# Patient Record
Sex: FEMALE | Race: WHITE | Employment: FULL TIME | URBAN - METROPOLITAN AREA
[De-identification: names, ages, dates, MRNs, and addresses within clinical notes are randomized per-mention and may not be internally consistent; named-entity substitution may affect disease eponyms.]

---

## 2017-01-06 ENCOUNTER — TELEPHONE (OUTPATIENT)
Dept: OBGYN CLINIC | Facility: CLINIC | Age: 32
End: 2017-01-06

## 2017-01-06 ENCOUNTER — NURSE ONLY (OUTPATIENT)
Dept: OBGYN CLINIC | Facility: CLINIC | Age: 32
End: 2017-01-06

## 2017-01-06 DIAGNOSIS — Z32.01 POSITIVE URINE PREGNANCY TEST: Primary | ICD-10-CM

## 2017-01-06 PROCEDURE — 84144 ASSAY OF PROGESTERONE: CPT | Performed by: OBSTETRICS & GYNECOLOGY

## 2017-01-06 PROCEDURE — 84702 CHORIONIC GONADOTROPIN TEST: CPT | Performed by: OBSTETRICS & GYNECOLOGY

## 2017-01-06 PROCEDURE — 36415 COLL VENOUS BLD VENIPUNCTURE: CPT | Performed by: OBSTETRICS & GYNECOLOGY

## 2017-01-06 NOTE — TELEPHONE ENCOUNTER
had two positive pregnancy test pt stated had heavy bleeding a week ago has appt for Monday with dr Jordan Corrales but wants to come in today for blood draw

## 2017-01-07 LAB
B-HCG SERPL-ACNC: 4135 MIU/ML
PROGEST SERPL-MCNC: 8 NG/ML

## 2017-01-09 ENCOUNTER — OFFICE VISIT (OUTPATIENT)
Dept: OBGYN CLINIC | Facility: CLINIC | Age: 32
End: 2017-01-09

## 2017-01-09 VITALS
SYSTOLIC BLOOD PRESSURE: 104 MMHG | HEIGHT: 67 IN | BODY MASS INDEX: 18.99 KG/M2 | WEIGHT: 121 LBS | DIASTOLIC BLOOD PRESSURE: 64 MMHG

## 2017-01-09 DIAGNOSIS — Z32.01 POSITIVE BLOOD PREGNANCY TEST: Primary | ICD-10-CM

## 2017-01-09 PROBLEM — O26.851 SPOTTING IN FIRST TRIMESTER: Status: ACTIVE | Noted: 2017-01-09

## 2017-01-09 LAB
B-HCG SERPL-ACNC: 6423 MIU/ML
PROGEST SERPL-MCNC: 5.8 NG/ML

## 2017-01-09 PROCEDURE — 99213 OFFICE O/P EST LOW 20 MIN: CPT | Performed by: OBSTETRICS & GYNECOLOGY

## 2017-01-09 PROCEDURE — 84144 ASSAY OF PROGESTERONE: CPT | Performed by: OBSTETRICS & GYNECOLOGY

## 2017-01-09 PROCEDURE — 84702 CHORIONIC GONADOTROPIN TEST: CPT | Performed by: OBSTETRICS & GYNECOLOGY

## 2017-01-09 PROCEDURE — 36415 COLL VENOUS BLD VENIPUNCTURE: CPT | Performed by: OBSTETRICS & GYNECOLOGY

## 2017-01-09 NOTE — PROGRESS NOTES
Bladimir Hines is here for a checkup. She is 27-year-old  3 para 1011 patient says that she is nursing every 4-6 hours her baby is 8 months old.   Her first normal.  After delivery was  following that she had a period  which l

## 2017-01-10 ENCOUNTER — TELEPHONE (OUTPATIENT)
Dept: OBGYN CLINIC | Facility: CLINIC | Age: 32
End: 2017-01-10

## 2017-01-10 DIAGNOSIS — O20.9 FIRST TRIMESTER BLEEDING: Primary | ICD-10-CM

## 2017-01-10 NOTE — TELEPHONE ENCOUNTER
Yarely Giles called regarding her beta hCG results from 6 to January and 9 January. I discussed both results with her. Her serum progesterone on has gone down to 5.8 and a beta-hCG has not risen as it should. Patient is taking progesterone orally.     Plan on

## 2017-01-11 ENCOUNTER — NURSE ONLY (OUTPATIENT)
Dept: OBGYN CLINIC | Facility: CLINIC | Age: 32
End: 2017-01-11

## 2017-01-11 ENCOUNTER — HOSPITAL ENCOUNTER (OUTPATIENT)
Dept: ULTRASOUND IMAGING | Facility: HOSPITAL | Age: 32
Discharge: HOME | End: 2017-01-11
Attending: OBSTETRICS & GYNECOLOGY
Payer: COMMERCIAL

## 2017-01-11 DIAGNOSIS — O20.9 FIRST TRIMESTER BLEEDING: ICD-10-CM

## 2017-01-11 DIAGNOSIS — O20.0 THREATENED ABORTION, ANTEPARTUM: Primary | ICD-10-CM

## 2017-01-11 LAB
B-HCG SERPL-ACNC: 7996 MIU/ML
PROGEST SERPL-MCNC: 17.9 NG/ML

## 2017-01-11 PROCEDURE — 84702 CHORIONIC GONADOTROPIN TEST: CPT | Performed by: OBSTETRICS & GYNECOLOGY

## 2017-01-11 PROCEDURE — 36415 COLL VENOUS BLD VENIPUNCTURE: CPT | Performed by: OBSTETRICS & GYNECOLOGY

## 2017-01-11 PROCEDURE — 76817 TRANSVAGINAL US OBSTETRIC: CPT

## 2017-01-11 PROCEDURE — 76801 OB US < 14 WKS SINGLE FETUS: CPT

## 2017-01-11 PROCEDURE — 84144 ASSAY OF PROGESTERONE: CPT | Performed by: OBSTETRICS & GYNECOLOGY

## 2017-01-13 ENCOUNTER — OFFICE VISIT (OUTPATIENT)
Dept: OBGYN CLINIC | Facility: CLINIC | Age: 32
End: 2017-01-13

## 2017-01-13 VITALS — BODY MASS INDEX: 19 KG/M2 | WEIGHT: 120.63 LBS | DIASTOLIC BLOOD PRESSURE: 70 MMHG | SYSTOLIC BLOOD PRESSURE: 102 MMHG

## 2017-01-13 DIAGNOSIS — O26.851 SPOTTING IN FIRST TRIMESTER: Primary | ICD-10-CM

## 2017-01-24 ENCOUNTER — HOSPITAL ENCOUNTER (OUTPATIENT)
Dept: ULTRASOUND IMAGING | Facility: HOSPITAL | Age: 32
Discharge: HOME | End: 2017-01-24
Attending: OBSTETRICS & GYNECOLOGY
Payer: COMMERCIAL

## 2017-01-24 DIAGNOSIS — O26.851 SPOTTING IN FIRST TRIMESTER: ICD-10-CM

## 2017-01-24 PROCEDURE — 76817 TRANSVAGINAL US OBSTETRIC: CPT

## 2017-01-24 PROCEDURE — 76801 OB US < 14 WKS SINGLE FETUS: CPT

## 2017-02-07 ENCOUNTER — OFFICE VISIT (OUTPATIENT)
Dept: OBGYN CLINIC | Facility: CLINIC | Age: 32
End: 2017-02-07

## 2017-02-07 ENCOUNTER — HOSPITAL ENCOUNTER (OUTPATIENT)
Dept: ULTRASOUND IMAGING | Age: 32
Discharge: HOME | End: 2017-02-07
Attending: OBSTETRICS & GYNECOLOGY
Payer: COMMERCIAL

## 2017-02-07 ENCOUNTER — OFFICE VISIT (OUTPATIENT)
Dept: LAB | Facility: REFERENCE LAB | Age: 32
End: 2017-02-07
Attending: OBSTETRICS & GYNECOLOGY
Payer: COMMERCIAL

## 2017-02-07 VITALS — SYSTOLIC BLOOD PRESSURE: 108 MMHG | BODY MASS INDEX: 19 KG/M2 | DIASTOLIC BLOOD PRESSURE: 74 MMHG | WEIGHT: 124 LBS

## 2017-02-07 DIAGNOSIS — Z34.90 ENCOUNTER FOR SUPERVISION OF NORMAL PREGNANCY, ANTEPARTUM, UNSPECIFIED GRAVIDITY: ICD-10-CM

## 2017-02-07 DIAGNOSIS — Z34.90 ENCOUNTER FOR SUPERVISION OF NORMAL PREGNANCY, ANTEPARTUM, UNSPECIFIED GRAVIDITY: Primary | ICD-10-CM

## 2017-02-07 LAB
BASOPHILS # BLD: 0.1 K/UL (ref 0–0.2)
BASOPHILS NFR BLD: 1 %
EOSINOPHIL # BLD: 0.1 K/UL (ref 0–0.7)
EOSINOPHIL NFR BLD: 1 %
ERYTHROCYTE [DISTWIDTH] IN BLOOD BY AUTOMATED COUNT: 12.9 % (ref 11–15)
HCT VFR BLD AUTO: 38.4 % (ref 35–48)
HGB BLD-MCNC: 13.1 G/DL (ref 12–16)
LYMPHOCYTES # BLD: 1.9 K/UL (ref 1–4)
LYMPHOCYTES NFR BLD: 18 %
MCH RBC QN AUTO: 29.9 PG (ref 27–32)
MCHC RBC AUTO-ENTMCNC: 34.3 G/DL (ref 32–37)
MCV RBC AUTO: 87.4 FL (ref 80–100)
MONOCYTES # BLD: 0.4 K/UL (ref 0–1)
MONOCYTES NFR BLD: 4 %
NEUTROPHILS # BLD AUTO: 7.7 K/UL (ref 1.8–7.7)
NEUTROPHILS NFR BLD: 76 %
PLATELET # BLD AUTO: 154 K/UL (ref 140–400)
PMV BLD AUTO: 11.9 FL (ref 7.4–10.3)
RBC # BLD AUTO: 4.39 M/UL (ref 3.7–5.4)
RUBV IGG SER-ACNC: 22 IU/ML
WBC # BLD AUTO: 10.2 K/UL (ref 4–11)

## 2017-02-07 PROCEDURE — 86850 RBC ANTIBODY SCREEN: CPT

## 2017-02-07 PROCEDURE — 87340 HEPATITIS B SURFACE AG IA: CPT

## 2017-02-07 PROCEDURE — 86762 RUBELLA ANTIBODY: CPT

## 2017-02-07 PROCEDURE — 86900 BLOOD TYPING SEROLOGIC ABO: CPT

## 2017-02-07 PROCEDURE — 76801 OB US < 14 WKS SINGLE FETUS: CPT

## 2017-02-07 PROCEDURE — 36415 COLL VENOUS BLD VENIPUNCTURE: CPT

## 2017-02-07 PROCEDURE — 86901 BLOOD TYPING SEROLOGIC RH(D): CPT

## 2017-02-07 PROCEDURE — 87389 HIV-1 AG W/HIV-1&-2 AB AG IA: CPT

## 2017-02-07 PROCEDURE — 76817 TRANSVAGINAL US OBSTETRIC: CPT

## 2017-02-07 PROCEDURE — 85025 COMPLETE CBC W/AUTO DIFF WBC: CPT

## 2017-02-07 PROCEDURE — 86780 TREPONEMA PALLIDUM: CPT

## 2017-02-07 NOTE — PROGRESS NOTES
Ashley Varner is here for a checkup. She has not had any vaginal spotting or bleeding at all. I could not hear the fetal heart tones by Doppler patient has acutely retroverted uterus.     Transvaginal and transabdominal ultrasound today at our facilities shows

## 2017-02-08 LAB
HBV SURFACE AG SERPL QL IA: NONREACTIVE
HIV1+2 AB SERPL QL IA: NONREACTIVE
T PALLIDUM AB SER QL: NEGATIVE

## 2017-02-09 LAB
ANTIBODY SCREEN: NEGATIVE
RH BLOOD TYPE: NEGATIVE

## 2017-02-17 ENCOUNTER — OFFICE VISIT (OUTPATIENT)
Dept: OBGYN CLINIC | Facility: CLINIC | Age: 32
End: 2017-02-17

## 2017-02-17 VITALS — SYSTOLIC BLOOD PRESSURE: 102 MMHG | BODY MASS INDEX: 19 KG/M2 | WEIGHT: 124 LBS | DIASTOLIC BLOOD PRESSURE: 64 MMHG

## 2017-02-17 DIAGNOSIS — Z34.90 ENCOUNTER FOR SUPERVISION OF NORMAL PREGNANCY, ANTEPARTUM, UNSPECIFIED GRAVIDITY: Primary | ICD-10-CM

## 2017-02-17 PROBLEM — O41.8X90 SUBCHORIONIC BLEED: Status: ACTIVE | Noted: 2017-02-17

## 2017-02-17 PROBLEM — O46.8X9 SUBCHORIONIC BLEED: Status: ACTIVE | Noted: 2017-02-17

## 2017-02-17 NOTE — PROGRESS NOTES
Sary Her is here for a checkup. She has no complaints she has not had any vaginal spotting or bleeding at all. Uterus is acutely retroverted and it is very difficult to listen to fetal heart tones still about 1415 weeks of pregnancy.   By date in size she

## 2017-02-27 ENCOUNTER — NURSE ONLY (OUTPATIENT)
Dept: OBGYN CLINIC | Facility: CLINIC | Age: 32
End: 2017-02-27

## 2017-02-27 ENCOUNTER — OFFICE VISIT (OUTPATIENT)
Dept: OBGYN CLINIC | Facility: CLINIC | Age: 32
End: 2017-02-27

## 2017-02-27 VITALS — DIASTOLIC BLOOD PRESSURE: 72 MMHG | SYSTOLIC BLOOD PRESSURE: 122 MMHG | WEIGHT: 127 LBS | BODY MASS INDEX: 20 KG/M2

## 2017-02-27 DIAGNOSIS — Z34.90 ENCOUNTER FOR SUPERVISION OF NORMAL PREGNANCY, ANTEPARTUM, UNSPECIFIED GRAVIDITY: Primary | ICD-10-CM

## 2017-02-27 NOTE — PROGRESS NOTES
Violeta Johnson is here for a checkup. By size and dates she is about 13 weeks fetal heart tones were heard by Doppler end of a normal.  I discussed her maternal 21 results.   Her maternal 21 results within normal.  Patient wanted to know the gender of the baby an

## 2017-02-28 NOTE — PROGRESS NOTES
Gabriela Horton is here for a transvaginal ultrasound.  Her ultrasound  today shows intrauterine gestation at 13 weeks and 1 day. Placenta is posterior fetal heart rate was 168 beats a minute.    There was 1.9 x 0.4 cm cystic interface adjacent to the  gestationa

## 2017-03-08 ENCOUNTER — OFFICE VISIT (OUTPATIENT)
Dept: OBGYN CLINIC | Facility: CLINIC | Age: 32
End: 2017-03-08

## 2017-03-08 VITALS — SYSTOLIC BLOOD PRESSURE: 112 MMHG | WEIGHT: 129 LBS | DIASTOLIC BLOOD PRESSURE: 72 MMHG | BODY MASS INDEX: 20 KG/M2

## 2017-03-08 DIAGNOSIS — Z34.90 ENCOUNTER FOR SUPERVISION OF NORMAL PREGNANCY, ANTEPARTUM, UNSPECIFIED GRAVIDITY: Primary | ICD-10-CM

## 2017-03-08 NOTE — PROGRESS NOTES
Brina Lopez is here for a prenatal check. She has no complaints. By size and dates at 14 weeks. Fetal heart tones were heard by Doppler end of a normal.  I will see her back again in 2 weeks I discussed quad screening with her.

## 2017-03-24 ENCOUNTER — APPOINTMENT (OUTPATIENT)
Dept: LAB | Facility: REFERENCE LAB | Age: 32
End: 2017-03-24
Attending: OBSTETRICS & GYNECOLOGY
Payer: COMMERCIAL

## 2017-03-24 ENCOUNTER — OFFICE VISIT (OUTPATIENT)
Dept: OBGYN CLINIC | Facility: CLINIC | Age: 32
End: 2017-03-24

## 2017-03-24 VITALS — SYSTOLIC BLOOD PRESSURE: 112 MMHG | DIASTOLIC BLOOD PRESSURE: 72 MMHG | WEIGHT: 131 LBS | BODY MASS INDEX: 21 KG/M2

## 2017-03-24 DIAGNOSIS — Z36.9 ENCOUNTER FOR ANTENATAL SCREENING OF MOTHER: Primary | ICD-10-CM

## 2017-03-24 DIAGNOSIS — Z36.9 ENCOUNTER FOR ANTENATAL SCREENING OF MOTHER: ICD-10-CM

## 2017-03-24 PROCEDURE — 36415 COLL VENOUS BLD VENIPUNCTURE: CPT

## 2017-03-24 PROCEDURE — 82105 ALPHA-FETOPROTEIN SERUM: CPT

## 2017-03-24 NOTE — PROGRESS NOTES
Christi Ortega is here for prenatal check.   She has no complaints by size and dates she is about 16 weeks fetal heart tones were heard by Doppler end of a normal.  I discussed quad screening with her patient would like to have the blood drawn today for quad scree

## 2017-03-27 LAB
FAMILY HISTORY OF ANEUPLOIDY: NO
FAMILY HX NEURAL TUBE DEFECT: NO
GESTATIONAL AGE (EXACT): 16.57 WEEKS
INSULIN REQ MATERNAL DIABETES: NO
MATERNAL AGE OF DELIVERY: 32 YR
MATERNAL SCREEN INTERPRETATION: NORMAL
MATERNAL WEIGHT: 131 LBS
MOM FOR AFP: 0.79
PATIENT'S AFP: 31 NG/ML

## 2017-04-19 ENCOUNTER — OFFICE VISIT (OUTPATIENT)
Dept: OBGYN CLINIC | Facility: CLINIC | Age: 32
End: 2017-04-19

## 2017-04-19 VITALS — WEIGHT: 137 LBS | DIASTOLIC BLOOD PRESSURE: 68 MMHG | BODY MASS INDEX: 21 KG/M2 | SYSTOLIC BLOOD PRESSURE: 102 MMHG

## 2017-04-19 DIAGNOSIS — Z34.90 ENCOUNTER FOR SUPERVISION OF NORMAL PREGNANCY, ANTEPARTUM, UNSPECIFIED GRAVIDITY: Primary | ICD-10-CM

## 2017-04-19 PROBLEM — Z86.2 H/O THROMBOCYTOPENIA: Status: ACTIVE | Noted: 2017-04-19

## 2017-04-19 NOTE — PROGRESS NOTES
Carissa Shaikh is here for prenatal check. She has no complaints she reports active fetal movements. Patient just had her fetal anatomy ultrasound and ultrasound was normal.  Her prenatal exam today was completely normal planning on seeing her again in 4 weeks.

## 2017-05-09 ENCOUNTER — HOSPITAL ENCOUNTER (EMERGENCY)
Facility: HOSPITAL | Age: 32
Discharge: HOME | End: 2017-05-09
Attending: EMERGENCY MEDICINE
Payer: COMMERCIAL

## 2017-05-09 VITALS
HEIGHT: 67 IN | TEMPERATURE: 98 F | BODY MASS INDEX: 21.66 KG/M2 | RESPIRATION RATE: 16 BRPM | HEART RATE: 86 BPM | SYSTOLIC BLOOD PRESSURE: 120 MMHG | OXYGEN SATURATION: 99 % | DIASTOLIC BLOOD PRESSURE: 67 MMHG | WEIGHT: 138 LBS

## 2017-05-09 DIAGNOSIS — R19.7 NAUSEA VOMITING AND DIARRHEA: Primary | ICD-10-CM

## 2017-05-09 DIAGNOSIS — R10.13 ABDOMINAL PAIN, EPIGASTRIC: ICD-10-CM

## 2017-05-09 DIAGNOSIS — R11.2 NAUSEA VOMITING AND DIARRHEA: Primary | ICD-10-CM

## 2017-05-09 PROCEDURE — 96361 HYDRATE IV INFUSION ADD-ON: CPT

## 2017-05-09 PROCEDURE — 86900 BLOOD TYPING SEROLOGIC ABO: CPT | Performed by: EMERGENCY MEDICINE

## 2017-05-09 PROCEDURE — 96374 THER/PROPH/DIAG INJ IV PUSH: CPT

## 2017-05-09 PROCEDURE — 85025 COMPLETE CBC W/AUTO DIFF WBC: CPT | Performed by: EMERGENCY MEDICINE

## 2017-05-09 PROCEDURE — 99284 EMERGENCY DEPT VISIT MOD MDM: CPT

## 2017-05-09 PROCEDURE — 86901 BLOOD TYPING SEROLOGIC RH(D): CPT | Performed by: EMERGENCY MEDICINE

## 2017-05-09 PROCEDURE — 80048 BASIC METABOLIC PNL TOTAL CA: CPT | Performed by: EMERGENCY MEDICINE

## 2017-05-09 PROCEDURE — 81001 URINALYSIS AUTO W/SCOPE: CPT | Performed by: EMERGENCY MEDICINE

## 2017-05-09 RX ORDER — ONDANSETRON 4 MG/1
4 TABLET, ORALLY DISINTEGRATING ORAL EVERY 4 HOURS PRN
Qty: 30 TABLET | Refills: 0 | Status: SHIPPED | OUTPATIENT
Start: 2017-05-09 | End: 2017-08-07

## 2017-05-09 RX ORDER — ONDANSETRON 2 MG/ML
4 INJECTION INTRAMUSCULAR; INTRAVENOUS ONCE
Status: COMPLETED | OUTPATIENT
Start: 2017-05-09 | End: 2017-05-09

## 2017-05-09 NOTE — ED INITIAL ASSESSMENT (HPI)
Vomiting and diarrhea since 2300 with upper abd cramping.  23 weeks pregnant, + fetal movement and denies contractions

## 2017-05-09 NOTE — ED PROVIDER NOTES
Patient Seen in: [facility] AND [facility] Emergency Department    History   Patient presents with:  Nausea/Vomiting/Diarrhea (gastrointestinal)      HPI    Patient presents complaining of intermittent vomiting and diarrhea since 11 PM tonight with associated up negative except as noted above. PSFH elements reviewed from today and agreed except as otherwise stated in HPI.     Physical Exam       ED Triage Vitals   BP 05/09/17 0226 113/68 mmHg   Pulse 05/09/17 0226 96   Resp 05/09/17 0226 18   Temp 05/09/17 0226 ---------                               -----------         ------                     CBC W/ DIFFERENTIAL[[id number]]          Abnormal            Final result                 Please view results for these tests on the individual orders.    BASIC METABOLI

## 2017-05-12 ENCOUNTER — TELEPHONE (OUTPATIENT)
Dept: OBGYN CLINIC | Facility: CLINIC | Age: 32
End: 2017-05-12

## 2017-05-12 NOTE — TELEPHONE ENCOUNTER
SPOKE WITH PATIENT REGARDING GETTING OVER STOMACH FLU FROM DAUGHTER WENT IN TO ER PER DR Rustam Simmons ON Monday TO GET FLUIDS NOW COMPLAIN OF HEADACHE TOLD HER ITS OKAY FOR HER TO TAKE TYLENOL PRODUCTS PATIENT WANT TO GO TO WORK TONIGHT TOLD HER HAVE THEM CHECK

## 2017-05-18 ENCOUNTER — LAB ENCOUNTER (OUTPATIENT)
Dept: LAB | Facility: REFERENCE LAB | Age: 32
End: 2017-05-18
Attending: OBSTETRICS & GYNECOLOGY
Payer: COMMERCIAL

## 2017-05-18 ENCOUNTER — OFFICE VISIT (OUTPATIENT)
Dept: OBGYN CLINIC | Facility: CLINIC | Age: 32
End: 2017-05-18

## 2017-05-18 VITALS — BODY MASS INDEX: 22 KG/M2 | WEIGHT: 141 LBS | DIASTOLIC BLOOD PRESSURE: 60 MMHG | SYSTOLIC BLOOD PRESSURE: 108 MMHG

## 2017-05-18 DIAGNOSIS — Z34.90 ENCOUNTER FOR SUPERVISION OF NORMAL PREGNANCY, ANTEPARTUM, UNSPECIFIED GRAVIDITY: ICD-10-CM

## 2017-05-18 DIAGNOSIS — Z36.9 ENCOUNTER FOR ANTENATAL SCREENING OF MOTHER: Primary | ICD-10-CM

## 2017-05-18 DIAGNOSIS — Z36.9 ENCOUNTER FOR ANTENATAL SCREENING OF MOTHER: ICD-10-CM

## 2017-05-18 PROBLEM — D69.6 THROMBOCYTOPENIA (HCC): Status: ACTIVE | Noted: 2017-05-18

## 2017-05-18 PROCEDURE — 36415 COLL VENOUS BLD VENIPUNCTURE: CPT | Performed by: OBSTETRICS & GYNECOLOGY

## 2017-05-18 NOTE — PROGRESS NOTES
Naheed Mendez is here for a prenatal check. Patient was seen at [place] emergency room 9 May for vomiting and diarrhea. She was hydrated she felt better and was discharged. Her CBC while she was in the emergency room showed platelet count of 582,211.   Cedric

## 2017-05-22 ENCOUNTER — TELEPHONE (OUTPATIENT)
Dept: OBGYN CLINIC | Facility: CLINIC | Age: 32
End: 2017-05-22

## 2017-05-31 ENCOUNTER — OFFICE VISIT (OUTPATIENT)
Dept: OBGYN CLINIC | Facility: CLINIC | Age: 32
End: 2017-05-31

## 2017-05-31 VITALS — WEIGHT: 142 LBS | SYSTOLIC BLOOD PRESSURE: 112 MMHG | DIASTOLIC BLOOD PRESSURE: 67 MMHG | BODY MASS INDEX: 22 KG/M2

## 2017-05-31 DIAGNOSIS — Z34.90 ENCOUNTER FOR SUPERVISION OF NORMAL PREGNANCY, ANTEPARTUM, UNSPECIFIED GRAVIDITY: Primary | ICD-10-CM

## 2017-05-31 PROBLEM — I86.3 LABIAL VARICOSITIES: Status: ACTIVE | Noted: 2017-05-31

## 2017-05-31 NOTE — PROGRESS NOTES
Yarely Giles is here for a prenatal check. She is complaining of swelling in her left labial area patient says that she did not have it with her last pregnancy.   Her prenatal exam today was completely normal.  I discussed fetal kick account  labor spont

## 2017-06-14 ENCOUNTER — OFFICE VISIT (OUTPATIENT)
Dept: OBGYN CLINIC | Facility: CLINIC | Age: 32
End: 2017-06-14

## 2017-06-14 ENCOUNTER — LAB ENCOUNTER (OUTPATIENT)
Dept: LAB | Facility: REFERENCE LAB | Age: 32
End: 2017-06-14
Attending: OBSTETRICS & GYNECOLOGY
Payer: COMMERCIAL

## 2017-06-14 VITALS — DIASTOLIC BLOOD PRESSURE: 70 MMHG | SYSTOLIC BLOOD PRESSURE: 112 MMHG | WEIGHT: 149 LBS | BODY MASS INDEX: 23 KG/M2

## 2017-06-14 DIAGNOSIS — Z36.9 ENCOUNTER FOR ANTENATAL SCREENING OF MOTHER: ICD-10-CM

## 2017-06-14 DIAGNOSIS — Z36.9 ENCOUNTER FOR ANTENATAL SCREENING OF MOTHER: Primary | ICD-10-CM

## 2017-06-14 PROCEDURE — 36415 COLL VENOUS BLD VENIPUNCTURE: CPT

## 2017-06-14 PROCEDURE — 90715 TDAP VACCINE 7 YRS/> IM: CPT | Performed by: OBSTETRICS & GYNECOLOGY

## 2017-06-14 PROCEDURE — 85025 COMPLETE CBC W/AUTO DIFF WBC: CPT

## 2017-06-14 PROCEDURE — 90471 IMMUNIZATION ADMIN: CPT | Performed by: OBSTETRICS & GYNECOLOGY

## 2017-06-14 PROCEDURE — 82950 GLUCOSE TEST: CPT

## 2017-06-14 NOTE — PROGRESS NOTES
Hector Roberts is here for a prenatal check. She reports active fetal movements. She is in the process of getting diabetic screening today.   Her prenatal exam today was completely normal.  I discussed  labor, fetal kick counts, spontaneous rupture of mem

## 2017-06-28 ENCOUNTER — OFFICE VISIT (OUTPATIENT)
Dept: OBGYN CLINIC | Facility: CLINIC | Age: 32
End: 2017-06-28

## 2017-06-28 VITALS — WEIGHT: 152 LBS | BODY MASS INDEX: 24 KG/M2 | SYSTOLIC BLOOD PRESSURE: 112 MMHG | DIASTOLIC BLOOD PRESSURE: 74 MMHG

## 2017-06-28 DIAGNOSIS — Z34.90 ENCOUNTER FOR SUPERVISION OF NORMAL PREGNANCY, ANTEPARTUM, UNSPECIFIED GRAVIDITY: Primary | ICD-10-CM

## 2017-06-28 PROBLEM — O99.012 ANTEPARTUM ANEMIA IN SECOND TRIMESTER: Status: ACTIVE | Noted: 2017-06-28

## 2017-06-28 PROBLEM — O26.899 RH NEGATIVE STATE IN ANTEPARTUM PERIOD: Status: ACTIVE | Noted: 2017-06-28

## 2017-06-28 PROBLEM — Z67.91 RH NEGATIVE STATE IN ANTEPARTUM PERIOD: Status: ACTIVE | Noted: 2017-06-28

## 2017-06-28 NOTE — PROGRESS NOTES
Jason Zapata is here for prenatal check. She reports active fetal movements. Her prenatal exam today was completely normal patient's hemoglobin was 10.6 and is currently taking oral iron tablets twice a day.   I discussed  labor, fetal kick counts, spon

## 2017-07-10 ENCOUNTER — OFFICE VISIT (OUTPATIENT)
Dept: OBGYN CLINIC | Facility: CLINIC | Age: 32
End: 2017-07-10

## 2017-07-10 VITALS — DIASTOLIC BLOOD PRESSURE: 76 MMHG | WEIGHT: 155 LBS | SYSTOLIC BLOOD PRESSURE: 118 MMHG | BODY MASS INDEX: 24 KG/M2

## 2017-07-10 DIAGNOSIS — Z34.90 ENCOUNTER FOR SUPERVISION OF NORMAL PREGNANCY, ANTEPARTUM, UNSPECIFIED GRAVIDITY: Primary | ICD-10-CM

## 2017-07-10 NOTE — PROGRESS NOTES
Erika Herrera for a checkup. Patient says that she was lifting up her 27 pound daughter over the weekend and been having back pain radiating to the back of her legs. She denies any contractions, rupture of membranes, vaginal bleeding.   She reports active fetal

## 2017-07-18 ENCOUNTER — HOSPITAL ENCOUNTER (OUTPATIENT)
Facility: HOSPITAL | Age: 32
Setting detail: OBSERVATION
Discharge: HOME | End: 2017-07-18
Attending: OBSTETRICS & GYNECOLOGY | Admitting: OBSTETRICS & GYNECOLOGY
Payer: COMMERCIAL

## 2017-07-18 VITALS — SYSTOLIC BLOOD PRESSURE: 117 MMHG | HEART RATE: 79 BPM | DIASTOLIC BLOOD PRESSURE: 71 MMHG

## 2017-07-18 PROBLEM — O26.899 CRAMPING AFFECTING PREGNANCY, ANTEPARTUM: Status: ACTIVE | Noted: 2017-07-18

## 2017-07-18 PROBLEM — R10.9 CRAMPING AFFECTING PREGNANCY, ANTEPARTUM: Status: ACTIVE | Noted: 2017-07-18

## 2017-07-18 LAB
BILIRUB UR QL: NEGATIVE
CLARITY UR: CLEAR
COLOR UR: YELLOW
FIBRONECTIN FETAL SPEC QL: NEGATIVE
GLUCOSE UR-MCNC: NEGATIVE MG/DL
HGB UR QL STRIP.AUTO: NEGATIVE
KETONES UR-MCNC: NEGATIVE MG/DL
LEUKOCYTE ESTERASE UR QL STRIP.AUTO: NEGATIVE
NITRITE UR QL STRIP.AUTO: NEGATIVE
PH UR: 7 [PH] (ref 5–8)
PROT UR-MCNC: NEGATIVE MG/DL
SP GR UR STRIP: 1.01 (ref 1–1.03)
UROBILINOGEN UR STRIP-ACNC: <2
VIT C UR-MCNC: NEGATIVE MG/DL

## 2017-07-18 PROCEDURE — 96366 THER/PROPH/DIAG IV INF ADDON: CPT

## 2017-07-18 PROCEDURE — 59025 FETAL NON-STRESS TEST: CPT

## 2017-07-18 PROCEDURE — 82731 ASSAY OF FETAL FIBRONECTIN: CPT | Performed by: OBSTETRICS & GYNECOLOGY

## 2017-07-18 PROCEDURE — 99215 OFFICE O/P EST HI 40 MIN: CPT

## 2017-07-18 PROCEDURE — 96365 THER/PROPH/DIAG IV INF INIT: CPT

## 2017-07-18 PROCEDURE — 81003 URINALYSIS AUTO W/O SCOPE: CPT | Performed by: OBSTETRICS & GYNECOLOGY

## 2017-07-18 RX ORDER — SODIUM CHLORIDE, SODIUM LACTATE, POTASSIUM CHLORIDE, CALCIUM CHLORIDE 600; 310; 30; 20 MG/100ML; MG/100ML; MG/100ML; MG/100ML
INJECTION, SOLUTION INTRAVENOUS
Status: COMPLETED
Start: 2017-07-18 | End: 2017-07-18

## 2017-07-18 RX ORDER — SODIUM CHLORIDE, SODIUM LACTATE, POTASSIUM CHLORIDE, CALCIUM CHLORIDE 600; 310; 30; 20 MG/100ML; MG/100ML; MG/100ML; MG/100ML
INJECTION, SOLUTION INTRAVENOUS CONTINUOUS
Status: DISCONTINUED | OUTPATIENT
Start: 2017-07-18 | End: 2017-07-18

## 2017-07-18 RX ORDER — SODIUM CHLORIDE, SODIUM LACTATE, POTASSIUM CHLORIDE, CALCIUM CHLORIDE 600; 310; 30; 20 MG/100ML; MG/100ML; MG/100ML; MG/100ML
INJECTION, SOLUTION INTRAVENOUS
Status: DISCONTINUED
Start: 2017-07-18 | End: 2017-07-18

## 2017-07-18 RX ORDER — SODIUM CHLORIDE 0.9 % (FLUSH) 0.9 %
10 SYRINGE (ML) INJECTION AS NEEDED
Status: DISCONTINUED | OUTPATIENT
Start: 2017-07-18 | End: 2017-07-18

## 2017-07-18 NOTE — TRIAGE
[facility]      Triage Note    Sultana Mejias Patient Status:  Observation    1985 MRN [id number]   Location [address] Attending Keshia Castro MD   [facility] Day # 0 PCP Ame Valdez.  MD Tamika Cortez to her abdomen. Pt noted to be rosaline q 3-9 per toco.  Pt only feeling 1/3 of the UCs. Orders rec'd for FFN, SVE, UA and IV. UA and FFN negative. Exam was fingertip/thick/high. Pts UCs subsided after 2 liters of LR.   Pt discharged home with instr

## 2017-07-18 NOTE — PROGRESS NOTES
Spoke with Dr Siria Mahoney re: pts uterine activity. Pt states that they are feeling better and RN found pt asleep in triage. Orders for discharge obtained and pt is to follow up at the office next week. Pt encouraged to call with any questions/concerns.

## 2017-07-19 ENCOUNTER — OFFICE VISIT (OUTPATIENT)
Dept: OBGYN CLINIC | Facility: CLINIC | Age: 32
End: 2017-07-19

## 2017-07-19 VITALS — SYSTOLIC BLOOD PRESSURE: 112 MMHG | DIASTOLIC BLOOD PRESSURE: 70 MMHG | BODY MASS INDEX: 25 KG/M2 | WEIGHT: 158 LBS

## 2017-07-19 DIAGNOSIS — Z34.90 ENCOUNTER FOR SUPERVISION OF NORMAL PREGNANCY, ANTEPARTUM, UNSPECIFIED GRAVIDITY: Primary | ICD-10-CM

## 2017-07-19 NOTE — PROGRESS NOTES
Gabriela Horton is here for a checkup. Patient was seen at [facility] triage  at night with possible  labor. She was having contractions every 3-7 minutes initially. Fetal heart tones were in category 1. Fetal fibronectin was negative.   Cervix was poster

## 2017-07-20 ENCOUNTER — TELEPHONE (OUTPATIENT)
Dept: OBGYN CLINIC | Facility: CLINIC | Age: 32
End: 2017-07-20

## 2017-07-20 ENCOUNTER — HOSPITAL ENCOUNTER (OUTPATIENT)
Facility: HOSPITAL | Age: 32
Setting detail: OBSERVATION
Discharge: HOME | End: 2017-07-20
Attending: OBSTETRICS & GYNECOLOGY | Admitting: OBSTETRICS & GYNECOLOGY
Payer: COMMERCIAL

## 2017-07-20 VITALS — DIASTOLIC BLOOD PRESSURE: 78 MMHG | SYSTOLIC BLOOD PRESSURE: 118 MMHG | HEART RATE: 85 BPM | OXYGEN SATURATION: 96 %

## 2017-07-20 PROBLEM — O47.9 IRREGULAR CONTRACTIONS: Status: ACTIVE | Noted: 2017-07-20

## 2017-07-20 LAB — FIBRONECTIN FETAL SPEC QL: NEGATIVE

## 2017-07-20 PROCEDURE — 96372 THER/PROPH/DIAG INJ SC/IM: CPT

## 2017-07-20 PROCEDURE — 59025 FETAL NON-STRESS TEST: CPT

## 2017-07-20 PROCEDURE — 82731 ASSAY OF FETAL FIBRONECTIN: CPT | Performed by: OBSTETRICS & GYNECOLOGY

## 2017-07-20 PROCEDURE — 96361 HYDRATE IV INFUSION ADD-ON: CPT

## 2017-07-20 PROCEDURE — 96360 HYDRATION IV INFUSION INIT: CPT

## 2017-07-20 PROCEDURE — 99213 OFFICE O/P EST LOW 20 MIN: CPT

## 2017-07-20 RX ORDER — BETAMETHASONE SODIUM PHOSPHATE AND BETAMETHASONE ACETATE 3; 3 MG/ML; MG/ML
INJECTION, SUSPENSION INTRA-ARTICULAR; INTRALESIONAL; INTRAMUSCULAR; SOFT TISSUE
Status: COMPLETED
Start: 2017-07-20 | End: 2017-07-20

## 2017-07-20 RX ORDER — SODIUM CHLORIDE, SODIUM LACTATE, POTASSIUM CHLORIDE, CALCIUM CHLORIDE 600; 310; 30; 20 MG/100ML; MG/100ML; MG/100ML; MG/100ML
INJECTION, SOLUTION INTRAVENOUS
Status: COMPLETED
Start: 2017-07-20 | End: 2017-07-20

## 2017-07-20 RX ORDER — BETAMETHASONE SODIUM PHOSPHATE AND BETAMETHASONE ACETATE 3; 3 MG/ML; MG/ML
12 INJECTION, SUSPENSION INTRA-ARTICULAR; INTRALESIONAL; INTRAMUSCULAR; SOFT TISSUE EVERY 24 HOURS
Status: DISCONTINUED | OUTPATIENT
Start: 2017-07-20 | End: 2017-07-20

## 2017-07-20 RX ORDER — SODIUM CHLORIDE, SODIUM LACTATE, POTASSIUM CHLORIDE, CALCIUM CHLORIDE 600; 310; 30; 20 MG/100ML; MG/100ML; MG/100ML; MG/100ML
INJECTION, SOLUTION INTRAVENOUS ONCE
Status: COMPLETED | OUTPATIENT
Start: 2017-07-20 | End: 2017-07-20

## 2017-07-20 NOTE — TRIAGE
[facility]      Triage Note    Nyla Rivera Patient Status:  Observation    1985 MRN [id number]   Location [address]  Attending Emma Albert MD   [facility] Day # 0 PCP Jose Roberto Casarez.  MD Shreya Ennis reinforced     Reason for visit: Deonte Fox, RN  7/20/2017 4:45 PM

## 2017-07-20 NOTE — PROGRESS NOTES
Patient seen and examined in triage. Jhoana Marquis is 28-year-old  2 para 1001 at 33 weeks and 3 days gestation. Patient was seen yesterday in triage with contractions every 10 minutes.   Patient was hydrated and after hydrations her contractions were

## 2017-07-21 ENCOUNTER — HOSPITAL ENCOUNTER (OUTPATIENT)
Facility: HOSPITAL | Age: 32
Discharge: HOME | End: 2017-07-21
Attending: OBSTETRICS & GYNECOLOGY | Admitting: OBSTETRICS & GYNECOLOGY
Payer: COMMERCIAL

## 2017-07-21 ENCOUNTER — HOSPITAL ENCOUNTER (OUTPATIENT)
Dept: OBGYN CLINIC | Facility: HOSPITAL | Age: 32
Discharge: HOME | End: 2017-07-21
Payer: COMMERCIAL

## 2017-07-21 PROCEDURE — 96372 THER/PROPH/DIAG INJ SC/IM: CPT

## 2017-07-21 RX ORDER — BETAMETHASONE SODIUM PHOSPHATE AND BETAMETHASONE ACETATE 3; 3 MG/ML; MG/ML
12 INJECTION, SUSPENSION INTRA-ARTICULAR; INTRALESIONAL; INTRAMUSCULAR; SOFT TISSUE EVERY 24 HOURS
Status: COMPLETED | OUTPATIENT
Start: 2017-07-21 | End: 2017-07-21

## 2017-07-24 ENCOUNTER — OFFICE VISIT (OUTPATIENT)
Dept: OBGYN CLINIC | Facility: CLINIC | Age: 32
End: 2017-07-24

## 2017-07-24 VITALS — SYSTOLIC BLOOD PRESSURE: 122 MMHG | DIASTOLIC BLOOD PRESSURE: 64 MMHG | BODY MASS INDEX: 25 KG/M2 | WEIGHT: 158 LBS

## 2017-07-24 DIAGNOSIS — Z34.90 ENCOUNTER FOR SUPERVISION OF NORMAL PREGNANCY, ANTEPARTUM, UNSPECIFIED GRAVIDITY: Primary | ICD-10-CM

## 2017-07-24 PROBLEM — O60.00 PRETERM LABOR: Status: ACTIVE | Noted: 2017-07-24

## 2017-07-24 RX ORDER — NIFEDIPINE 10 MG/1
10 CAPSULE ORAL EVERY 8 HOURS SCHEDULED
Status: DISCONTINUED | OUTPATIENT
Start: 2017-07-24 | End: 2017-07-24

## 2017-07-24 RX ORDER — NIFEDIPINE 10 MG/1
10 CAPSULE ORAL 3 TIMES DAILY
Qty: 63 CAPSULE | Refills: 0 | Status: SHIPPED | OUTPATIENT
Start: 2017-07-24 | End: 2017-08-07

## 2017-07-24 NOTE — PROGRESS NOTES
Herminia in [facility] triage twice over the weekend with symptoms of uterine contractions. Patient was evaluated by me and also I had MFM phone consult. Her fibronectin was negative. Cervix had changed minimally.   Dr. Geremias Gutiérrez was consulted and plan was to give h

## 2017-07-24 NOTE — ADDENDUM NOTE
Encounter addended by:  Holland Weber on: 7/24/2017 11:17 AM<BR>    Actions taken:  activity accessed

## 2017-07-24 NOTE — ADDENDUM NOTE
Encounter addended by:  Lindsay Badillo on: 7/24/2017 11:15 AM<BR>    Actions taken:  activity accessed

## 2017-08-01 ENCOUNTER — OFFICE VISIT (OUTPATIENT)
Dept: OBGYN CLINIC | Facility: CLINIC | Age: 32
End: 2017-08-01

## 2017-08-01 VITALS — DIASTOLIC BLOOD PRESSURE: 66 MMHG | WEIGHT: 159 LBS | BODY MASS INDEX: 25 KG/M2 | SYSTOLIC BLOOD PRESSURE: 104 MMHG

## 2017-08-01 DIAGNOSIS — Z34.90 ENCOUNTER FOR SUPERVISION OF NORMAL PREGNANCY, ANTEPARTUM, UNSPECIFIED GRAVIDITY: Primary | ICD-10-CM

## 2017-08-01 PROBLEM — O47.00 PRETERM CONTRACTIONS: Status: ACTIVE | Noted: 2017-08-01

## 2017-08-01 NOTE — PROGRESS NOTES
Jaiden Franco is here for prenatal check. Patient says that since she went on Procardia her contractions have been infrequent. Her prenatal exam today was completely normal I did not do pelvic exam on her today. Plan on her is to stop Procardia at 37 weeks.

## 2017-08-07 ENCOUNTER — LAB ENCOUNTER (OUTPATIENT)
Dept: LAB | Facility: REFERENCE LAB | Age: 32
End: 2017-08-07
Attending: OBSTETRICS & GYNECOLOGY
Payer: COMMERCIAL

## 2017-08-07 ENCOUNTER — ULTRASOUND ENCOUNTER (OUTPATIENT)
Dept: OBGYN CLINIC | Facility: CLINIC | Age: 32
End: 2017-08-07

## 2017-08-07 ENCOUNTER — OFFICE VISIT (OUTPATIENT)
Dept: OBGYN CLINIC | Facility: CLINIC | Age: 32
End: 2017-08-07

## 2017-08-07 VITALS
WEIGHT: 159 LBS | BODY MASS INDEX: 24.96 KG/M2 | SYSTOLIC BLOOD PRESSURE: 112 MMHG | HEIGHT: 67 IN | DIASTOLIC BLOOD PRESSURE: 70 MMHG

## 2017-08-07 DIAGNOSIS — Z03.73 SUSPECTED FETAL ANOMALY NOT FOUND: ICD-10-CM

## 2017-08-07 DIAGNOSIS — Z03.74 SUSPECTED PROBLEM WITH FETAL GROWTH NOT FOUND: ICD-10-CM

## 2017-08-07 DIAGNOSIS — Z36.9 ENCOUNTER FOR ANTENATAL SCREENING OF MOTHER: Primary | ICD-10-CM

## 2017-08-07 DIAGNOSIS — O32.2XX0 TRANSVERSE LIE OF FETUS, NOT APPLICABLE OR UNSPECIFIED FETUS: ICD-10-CM

## 2017-08-07 DIAGNOSIS — Z34.83 ENCOUNTER FOR SUPERVISION OF OTHER NORMAL PREGNANCY IN THIRD TRIMESTER: ICD-10-CM

## 2017-08-07 DIAGNOSIS — Z36.9 ENCOUNTER FOR ANTENATAL SCREENING OF MOTHER: ICD-10-CM

## 2017-08-07 PROBLEM — O09.219 H/O PRECIPITOUS LABOR AND DELIVERIES, ANTEPARTUM: Status: ACTIVE | Noted: 2017-08-07

## 2017-08-07 PROBLEM — O47.00 PRETERM CONTRACTIONS: Status: RESOLVED | Noted: 2017-08-01 | Resolved: 2017-08-07

## 2017-08-07 PROBLEM — O60.00 PRETERM LABOR: Status: RESOLVED | Noted: 2017-07-24 | Resolved: 2017-08-07

## 2017-08-07 PROBLEM — IMO0002 FETAL ANOMALY: Status: ACTIVE | Noted: 2017-08-07

## 2017-08-07 LAB
BASOPHILS # BLD: 0 K/UL (ref 0–0.2)
BASOPHILS NFR BLD: 0 %
EOSINOPHIL # BLD: 0.1 K/UL (ref 0–0.7)
EOSINOPHIL NFR BLD: 1 %
ERYTHROCYTE [DISTWIDTH] IN BLOOD BY AUTOMATED COUNT: 16.8 % (ref 11–15)
HCT VFR BLD AUTO: 36.6 % (ref 35–48)
HGB BLD-MCNC: 12.3 G/DL (ref 12–16)
LYMPHOCYTES # BLD: 0.5 K/UL (ref 1–4)
LYMPHOCYTES NFR BLD: 7 %
MCH RBC QN AUTO: 29.3 PG (ref 27–32)
MCHC RBC AUTO-ENTMCNC: 33.5 G/DL (ref 32–37)
MCV RBC AUTO: 87.5 FL (ref 80–100)
MONOCYTES # BLD: 0.5 K/UL (ref 0–1)
MONOCYTES NFR BLD: 6 %
NEUTROPHILS # BLD AUTO: 6.6 K/UL (ref 1.8–7.7)
NEUTROPHILS NFR BLD: 86 %
PLATELET # BLD AUTO: 122 K/UL (ref 140–400)
PMV BLD AUTO: 10.8 FL (ref 7.4–10.3)
RBC # BLD AUTO: 4.19 M/UL (ref 3.7–5.4)
WBC # BLD AUTO: 7.7 K/UL (ref 4–11)

## 2017-08-07 PROCEDURE — 87081 CULTURE SCREEN ONLY: CPT | Performed by: OBSTETRICS & GYNECOLOGY

## 2017-08-07 PROCEDURE — 36415 COLL VENOUS BLD VENIPUNCTURE: CPT

## 2017-08-07 PROCEDURE — 85025 COMPLETE CBC W/AUTO DIFF WBC: CPT

## 2017-08-07 PROCEDURE — 87653 STREP B DNA AMP PROBE: CPT | Performed by: OBSTETRICS & GYNECOLOGY

## 2017-08-07 PROCEDURE — 76816 OB US FOLLOW-UP PER FETUS: CPT | Performed by: OBSTETRICS & GYNECOLOGY

## 2017-08-07 PROCEDURE — 86780 TREPONEMA PALLIDUM: CPT

## 2017-08-07 NOTE — PROGRESS NOTES
USN here EFW 2705 g, 38.4%ile, YISSEL 9.23 in vertex presentation. Refer to MFM for fetal echo due to very prominent echogenic focus in R ventricle.

## 2017-08-07 NOTE — PROGRESS NOTES
USN here vertex presentation with EFW 2705 g, 38.4%ile with YISSEL 9.23 cm. There is a very prominent echogenic focus in the R ventricle. I will refer her for fetal echo.

## 2017-08-07 NOTE — PROGRESS NOTES
DW pt that she can stop procardia. GBBS sent. FH lagging, and seems to be transverse position. Check usn for growth.

## 2017-08-08 PROBLEM — R10.9 CRAMPING AFFECTING PREGNANCY, ANTEPARTUM: Status: RESOLVED | Noted: 2017-07-18 | Resolved: 2017-08-08

## 2017-08-08 PROBLEM — O26.851 SPOTTING IN FIRST TRIMESTER: Status: RESOLVED | Noted: 2017-01-09 | Resolved: 2017-08-08

## 2017-08-08 PROBLEM — O99.119 THROMBOCYTOPENIA AFFECTING PREGNANCY (HCC): Status: ACTIVE | Noted: 2017-08-08

## 2017-08-08 PROBLEM — D69.6 THROMBOCYTOPENIA AFFECTING PREGNANCY (HCC): Status: ACTIVE | Noted: 2017-08-08

## 2017-08-08 PROBLEM — O26.899 CRAMPING AFFECTING PREGNANCY, ANTEPARTUM: Status: RESOLVED | Noted: 2017-07-18 | Resolved: 2017-08-08

## 2017-08-08 PROBLEM — Z36.9 ENCOUNTER FOR ANTENATAL SCREENING OF MOTHER: Status: RESOLVED | Noted: 2017-08-07 | Resolved: 2017-08-08

## 2017-08-08 PROBLEM — O47.9 IRREGULAR CONTRACTIONS: Status: RESOLVED | Noted: 2017-07-20 | Resolved: 2017-08-08

## 2017-08-08 PROBLEM — O41.8X90 SUBCHORIONIC BLEED: Status: RESOLVED | Noted: 2017-02-17 | Resolved: 2017-08-08

## 2017-08-08 PROBLEM — O32.2XX0 TRANSVERSE LIE OF FETUS: Status: RESOLVED | Noted: 2017-08-07 | Resolved: 2017-08-08

## 2017-08-08 PROBLEM — O46.8X9 SUBCHORIONIC BLEED: Status: RESOLVED | Noted: 2017-02-17 | Resolved: 2017-08-08

## 2017-08-08 LAB — T PALLIDUM AB SER QL: NEGATIVE

## 2017-08-09 ENCOUNTER — HOSPITAL ENCOUNTER (OUTPATIENT)
Dept: PERINATAL CARE | Facility: HOSPITAL | Age: 32
Discharge: HOME | End: 2017-08-09
Attending: OBSTETRICS & GYNECOLOGY
Payer: COMMERCIAL

## 2017-08-09 VITALS — HEART RATE: 85 BPM | SYSTOLIC BLOOD PRESSURE: 126 MMHG | DIASTOLIC BLOOD PRESSURE: 75 MMHG

## 2017-08-09 DIAGNOSIS — O28.3 ECHOGENIC INTRACARDIAC FOCUS OF FETUS ON PRENATAL ULTRASOUND: ICD-10-CM

## 2017-08-09 DIAGNOSIS — O28.3 ECHOGENIC INTRACARDIAC FOCUS OF FETUS ON PRENATAL ULTRASOUND: Primary | ICD-10-CM

## 2017-08-09 PROCEDURE — 76825 ECHO EXAM OF FETAL HEART: CPT | Performed by: OBSTETRICS & GYNECOLOGY

## 2017-08-09 PROCEDURE — 76827 ECHO EXAM OF FETAL HEART: CPT | Performed by: OBSTETRICS & GYNECOLOGY

## 2017-08-09 PROCEDURE — 99242 OFF/OP CONSLTJ NEW/EST SF 20: CPT | Performed by: OBSTETRICS & GYNECOLOGY

## 2017-08-09 PROCEDURE — 93325 DOPPLER ECHO COLOR FLOW MAPG: CPT | Performed by: OBSTETRICS & GYNECOLOGY

## 2017-08-11 ENCOUNTER — TELEPHONE (OUTPATIENT)
Dept: OBGYN CLINIC | Facility: CLINIC | Age: 32
End: 2017-08-11

## 2017-08-14 ENCOUNTER — OFFICE VISIT (OUTPATIENT)
Dept: OBGYN CLINIC | Facility: CLINIC | Age: 32
End: 2017-08-14

## 2017-08-14 ENCOUNTER — LAB ENCOUNTER (OUTPATIENT)
Dept: LAB | Facility: REFERENCE LAB | Age: 32
End: 2017-08-14
Attending: OBSTETRICS & GYNECOLOGY
Payer: COMMERCIAL

## 2017-08-14 VITALS
HEIGHT: 67 IN | BODY MASS INDEX: 25.33 KG/M2 | WEIGHT: 161.38 LBS | SYSTOLIC BLOOD PRESSURE: 120 MMHG | DIASTOLIC BLOOD PRESSURE: 78 MMHG

## 2017-08-14 DIAGNOSIS — Z36.9 ENCOUNTER FOR ANTENATAL SCREENING OF MOTHER: ICD-10-CM

## 2017-08-14 DIAGNOSIS — Z36.9 ENCOUNTER FOR ANTENATAL SCREENING OF MOTHER: Primary | ICD-10-CM

## 2017-08-14 LAB
BASOPHILS # BLD: 0.1 K/UL (ref 0–0.2)
BASOPHILS NFR BLD: 1 %
EOSINOPHIL # BLD: 0.1 K/UL (ref 0–0.7)
EOSINOPHIL NFR BLD: 1 %
ERYTHROCYTE [DISTWIDTH] IN BLOOD BY AUTOMATED COUNT: 16.5 % (ref 11–15)
HCT VFR BLD AUTO: 37.4 % (ref 35–48)
HGB BLD-MCNC: 12.4 G/DL (ref 12–16)
LYMPHOCYTES # BLD: 1.4 K/UL (ref 1–4)
LYMPHOCYTES NFR BLD: 14 %
MCH RBC QN AUTO: 29 PG (ref 27–32)
MCHC RBC AUTO-ENTMCNC: 33.1 G/DL (ref 32–37)
MCV RBC AUTO: 87.5 FL (ref 80–100)
MONOCYTES # BLD: 0.6 K/UL (ref 0–1)
MONOCYTES NFR BLD: 6 %
NEUTROPHILS # BLD AUTO: 7.8 K/UL (ref 1.8–7.7)
NEUTROPHILS NFR BLD: 78 %
PLATELET # BLD AUTO: 133 K/UL (ref 140–400)
PMV BLD AUTO: 10.9 FL (ref 7.4–10.3)
RBC # BLD AUTO: 4.27 M/UL (ref 3.7–5.4)
WBC # BLD AUTO: 10 K/UL (ref 4–11)

## 2017-08-14 PROCEDURE — 85025 COMPLETE CBC W/AUTO DIFF WBC: CPT

## 2017-08-14 PROCEDURE — 36415 COLL VENOUS BLD VENIPUNCTURE: CPT

## 2017-08-14 NOTE — PROGRESS NOTES
Doing well, no ob complaints. Last plts = 122. For repeat today. Reviewed labor precautions in light of 6 hour labor last pregnancy( 39w 7#9).

## 2017-08-23 ENCOUNTER — OFFICE VISIT (OUTPATIENT)
Dept: OBGYN CLINIC | Facility: CLINIC | Age: 32
End: 2017-08-23

## 2017-08-23 VITALS — DIASTOLIC BLOOD PRESSURE: 68 MMHG | SYSTOLIC BLOOD PRESSURE: 112 MMHG | WEIGHT: 167 LBS | BODY MASS INDEX: 26 KG/M2

## 2017-08-23 DIAGNOSIS — Z34.90 ENCOUNTER FOR SUPERVISION OF NORMAL PREGNANCY, ANTEPARTUM, UNSPECIFIED GRAVIDITY: Primary | ICD-10-CM

## 2017-08-23 NOTE — PROGRESS NOTES
Jaiden Franco is here for prenatal check. She has no complaints. She denies any headaches, blurred vision, nausea or abdominal pain. Her platelet count from last week was 132,000.   Her prenatal exam today was completely normal.

## 2017-08-30 ENCOUNTER — OFFICE VISIT (OUTPATIENT)
Dept: OBGYN CLINIC | Facility: CLINIC | Age: 32
End: 2017-08-30

## 2017-08-30 VITALS
BODY MASS INDEX: 26.29 KG/M2 | SYSTOLIC BLOOD PRESSURE: 116 MMHG | HEIGHT: 67 IN | WEIGHT: 167.5 LBS | DIASTOLIC BLOOD PRESSURE: 60 MMHG

## 2017-08-30 DIAGNOSIS — O09.219 H/O PRECIPITOUS LABOR AND DELIVERIES, ANTEPARTUM: Primary | ICD-10-CM

## 2017-09-03 ENCOUNTER — HOSPITAL ENCOUNTER (INPATIENT)
Facility: HOSPITAL | Age: 32
LOS: 2 days | Discharge: HOME | End: 2017-09-05
Attending: OBSTETRICS & GYNECOLOGY | Admitting: OBSTETRICS & GYNECOLOGY
Payer: COMMERCIAL

## 2017-09-03 PROBLEM — Z34.90 PREGNANCY: Status: ACTIVE | Noted: 2017-09-03

## 2017-09-03 LAB
ANTIBODY SCREEN: POSITIVE
BASOPHILS # BLD: 0.1 K/UL (ref 0–0.2)
BASOPHILS NFR BLD: 1 %
DIRECT COOMBS POLY: NEGATIVE
EOSINOPHIL # BLD: 0.2 K/UL (ref 0–0.7)
EOSINOPHIL NFR BLD: 2 %
ERYTHROCYTE [DISTWIDTH] IN BLOOD BY AUTOMATED COUNT: 16.6 % (ref 11–15)
FETAL SCREEN RESULT: NEGATIVE
HCT VFR BLD AUTO: 35.8 % (ref 35–48)
HGB BLD-MCNC: 12.1 G/DL (ref 12–16)
LYMPHOCYTES # BLD: 1.2 K/UL (ref 1–4)
LYMPHOCYTES NFR BLD: 11 %
MCH RBC QN AUTO: 28.9 PG (ref 27–32)
MCHC RBC AUTO-ENTMCNC: 33.8 G/DL (ref 32–37)
MCV RBC AUTO: 85.4 FL (ref 80–100)
MONOCYTES # BLD: 0.9 K/UL (ref 0–1)
MONOCYTES NFR BLD: 8 %
NEUTROPHILS # BLD AUTO: 8.3 K/UL (ref 1.8–7.7)
NEUTROPHILS NFR BLD: 79 %
PLATELET # BLD AUTO: 129 K/UL (ref 140–400)
PMV BLD AUTO: 11.7 FL (ref 7.4–10.3)
RBC # BLD AUTO: 4.19 M/UL (ref 3.7–5.4)
RH BLOOD TYPE: NEGATIVE
WBC # BLD AUTO: 10.6 K/UL (ref 4–11)

## 2017-09-03 PROCEDURE — 10907ZC DRAINAGE OF AMNIOTIC FLUID, THERAPEUTIC FROM PRODUCTS OF CONCEPTION, VIA NATURAL OR ARTIFICIAL OPENING: ICD-10-PCS | Performed by: OBSTETRICS & GYNECOLOGY

## 2017-09-03 PROCEDURE — 59400 OBSTETRICAL CARE: CPT | Performed by: OBSTETRICS & GYNECOLOGY

## 2017-09-03 RX ORDER — BISACODYL 10 MG
10 SUPPOSITORY, RECTAL RECTAL ONCE AS NEEDED
Status: DISCONTINUED | OUTPATIENT
Start: 2017-09-03 | End: 2017-09-05

## 2017-09-03 RX ORDER — SODIUM CHLORIDE 0.9 % (FLUSH) 0.9 %
10 SYRINGE (ML) INJECTION AS NEEDED
Status: DISCONTINUED | OUTPATIENT
Start: 2017-09-03 | End: 2017-09-03

## 2017-09-03 RX ORDER — IBUPROFEN 600 MG/1
600 TABLET ORAL ONCE AS NEEDED
Status: DISCONTINUED | OUTPATIENT
Start: 2017-09-03 | End: 2017-09-03

## 2017-09-03 RX ORDER — IBUPROFEN 200 MG
200 TABLET ORAL EVERY 4 HOURS PRN
Status: DISCONTINUED | OUTPATIENT
Start: 2017-09-03 | End: 2017-09-05

## 2017-09-03 RX ORDER — DEXTROSE, SODIUM CHLORIDE, SODIUM LACTATE, POTASSIUM CHLORIDE, AND CALCIUM CHLORIDE 5; .6; .31; .03; .02 G/100ML; G/100ML; G/100ML; G/100ML; G/100ML
125 INJECTION, SOLUTION INTRAVENOUS CONTINUOUS
Status: DISCONTINUED | OUTPATIENT
Start: 2017-09-03 | End: 2017-09-03

## 2017-09-03 RX ORDER — SODIUM CHLORIDE 0.9 % (FLUSH) 0.9 %
10 SYRINGE (ML) INJECTION AS NEEDED
Status: DISCONTINUED | OUTPATIENT
Start: 2017-09-03 | End: 2017-09-05

## 2017-09-03 RX ORDER — IBUPROFEN 200 MG
400 TABLET ORAL EVERY 4 HOURS PRN
Status: DISCONTINUED | OUTPATIENT
Start: 2017-09-03 | End: 2017-09-05

## 2017-09-03 RX ORDER — TRISODIUM CITRATE DIHYDRATE AND CITRIC ACID MONOHYDRATE 500; 334 MG/5ML; MG/5ML
30 SOLUTION ORAL AS NEEDED
Status: DISCONTINUED | OUTPATIENT
Start: 2017-09-03 | End: 2017-09-03

## 2017-09-03 RX ORDER — PRENATAL VIT,CAL 76/IRON/FOLIC 29 MG-1 MG
1 TABLET ORAL DAILY
Status: DISCONTINUED | OUTPATIENT
Start: 2017-09-03 | End: 2017-09-05

## 2017-09-03 RX ORDER — LIDOCAINE HYDROCHLORIDE 10 MG/ML
30 INJECTION, SOLUTION EPIDURAL; INFILTRATION; INTRACAUDAL; PERINEURAL ONCE
Status: COMPLETED | OUTPATIENT
Start: 2017-09-03 | End: 2017-09-03

## 2017-09-03 RX ORDER — DIAPER,BRIEF,INFANT-TODD,DISP
1 EACH MISCELLANEOUS EVERY 6 HOURS PRN
Status: DISCONTINUED | OUTPATIENT
Start: 2017-09-03 | End: 2017-09-05

## 2017-09-03 RX ORDER — AMMONIA INHALANTS 0.04 G/.3ML
0.3 INHALANT RESPIRATORY (INHALATION) AS NEEDED
Status: DISCONTINUED | OUTPATIENT
Start: 2017-09-03 | End: 2017-09-03

## 2017-09-03 RX ORDER — IBUPROFEN 600 MG/1
600 TABLET ORAL EVERY 4 HOURS PRN
Status: DISCONTINUED | OUTPATIENT
Start: 2017-09-03 | End: 2017-09-05

## 2017-09-03 RX ORDER — ONDANSETRON 2 MG/ML
4 INJECTION INTRAMUSCULAR; INTRAVENOUS EVERY 6 HOURS PRN
Status: DISCONTINUED | OUTPATIENT
Start: 2017-09-03 | End: 2017-09-05

## 2017-09-03 RX ORDER — TERBUTALINE SULFATE 1 MG/ML
0.25 INJECTION, SOLUTION SUBCUTANEOUS AS NEEDED
Status: DISCONTINUED | OUTPATIENT
Start: 2017-09-03 | End: 2017-09-03

## 2017-09-03 RX ORDER — DEXTROSE, SODIUM CHLORIDE, SODIUM LACTATE, POTASSIUM CHLORIDE, AND CALCIUM CHLORIDE 5; .6; .31; .03; .02 G/100ML; G/100ML; G/100ML; G/100ML; G/100ML
INJECTION, SOLUTION INTRAVENOUS
Status: COMPLETED
Start: 2017-09-03 | End: 2017-09-03

## 2017-09-03 RX ORDER — SIMETHICONE 80 MG
80 TABLET,CHEWABLE ORAL 3 TIMES DAILY PRN
Status: DISCONTINUED | OUTPATIENT
Start: 2017-09-03 | End: 2017-09-05

## 2017-09-03 RX ORDER — DOCUSATE SODIUM 100 MG/1
100 CAPSULE, LIQUID FILLED ORAL 2 TIMES DAILY
Status: DISCONTINUED | OUTPATIENT
Start: 2017-09-03 | End: 2017-09-05

## 2017-09-03 RX ORDER — AMMONIA INHALANTS 0.04 G/.3ML
0.3 INHALANT RESPIRATORY (INHALATION) AS NEEDED
Status: DISCONTINUED | OUTPATIENT
Start: 2017-09-03 | End: 2017-09-05

## 2017-09-03 NOTE — L&D DELIVERY NOTE
Alison Hendrix, Girl  [[id number]]     Labor Events     labor?:  Yes    steroids?:  Full Course   Antibiotics received during labor?:  No   Antibiotics (enter # doses in comment):  none   Rupture date:  9/3/17   Rupture time:  1559   Rupture type Jenae Gonzales MD

## 2017-09-03 NOTE — H&P
[address] [facility] Patient Status:  Observation    1985 MRN [id number]   Location [address] [place] [address] Attending Reena Lewis Day # 0 PCP London Rojas.  Wendi Garcia MD bilaterally  Cardiac: regular rate and rhythm, S1, S2 normal, no murmur, click, rub or gallop  Abdomen: FHT present  Fetal Surveillance:  140 BPM; Fetal heart variability: moderate  Sterile vaginal exam: deferred  Cervix: no lesions or cervical motion tend Admission is planned for today. Anticipate vaginal delivery. Brenda Lewis  9/3/2017  3:13 PM

## 2017-09-04 LAB
HCT VFR BLD AUTO: 32.7 % (ref 35–48)
HGB BLD-MCNC: 11 G/DL (ref 12–16)

## 2017-09-04 PROCEDURE — 3E0234Z INTRODUCTION OF SERUM, TOXOID AND VACCINE INTO MUSCLE, PERCUTANEOUS APPROACH: ICD-10-PCS | Performed by: OBSTETRICS & GYNECOLOGY

## 2017-09-04 NOTE — PROGRESS NOTES
Patient without complaints. No heavy bleeding or pain. AFEB VSS   FF    hgb 11.0/hct = 32.7    A/P: normal PP care.

## 2017-09-04 NOTE — LACTATION NOTE
LACTATION NOTE - MOTHER      Evaluation Type: Inpatient    Problems identified  Problems identified: Knowledge deficit; Unable to acheive sustained latch    Maternal history  Maternal history: Anemia; Induction of labor  Other/comment: infant with club foot

## 2017-09-04 NOTE — PROGRESS NOTES
Transferred to [facility] care via wheelchair. IV SL. Ambulating with assistance, Assessment WNL, holding baby.

## 2017-09-04 NOTE — PROGRESS NOTES
Received patient into room 354  Bedside shift report received from [facility], RN.  Patient transferred to bed from [facility] Bed in locked and low position.  Side rails up X2.  Vital signs stable, fundus FIRM at U/1, lochia SMALL, 0 clots.  IV site RW fluid

## 2017-09-04 NOTE — LACTATION NOTE
This note was copied from a baby's chart.   LACTATION NOTE - INFANT    Evaluation Type  Evaluation Type: Inpatient    Problems & Assessment  Problems Diagnosed or Identified: Latch difficulty;Sleepy  Infant Assessment: Skin color: pink or appropriate for et Handling Breastmilk; Outpatient lactation clinic handout  Evaluation of education: Mother verbalized understanding; Mother requires additional follow up;Significant other included in teaching  Advised mom to use nipple shield if infant continues to have latc

## 2017-09-05 VITALS
DIASTOLIC BLOOD PRESSURE: 77 MMHG | SYSTOLIC BLOOD PRESSURE: 117 MMHG | BODY MASS INDEX: 26.21 KG/M2 | HEIGHT: 67 IN | WEIGHT: 167 LBS | HEART RATE: 67 BPM | TEMPERATURE: 98 F | RESPIRATION RATE: 16 BRPM

## 2017-09-05 RX ORDER — IBUPROFEN 600 MG/1
600 TABLET ORAL EVERY 4 HOURS PRN
Qty: 60 TABLET | Refills: 2 | Status: SHIPPED | OUTPATIENT
Start: 2017-09-05 | End: 2018-09-18 | Stop reason: ALTCHOICE

## 2017-09-05 NOTE — DISCHARGE SUMMARY
[facility] - [facility]    Discharge Summary    Skyler Albarran Patient Status:  Inpatient    1985 MRN [id number]   Location [facility] 3SE Attending Reena Lewis Day # 2 PCP Nilda Denny.  Irma Laird MD     Date of Admission

## 2017-09-05 NOTE — PLAN OF CARE
BREAST FEEDING    • Optimize infant feeding at the breast Progressing        Patient/Family Goals    • Patient/Family Long Term Goal Progressing    • Patient/Family Short Term Goal Progressing        POSTPARTUM    • Long Term Goal:Experiences normal postpa

## 2017-09-05 NOTE — PLAN OF CARE
Problem: POSTPARTUM  Goal: Optimize infant feeding at the breast  INTERVENTIONS:  - Initiate breast feeding within first hour after birth. - Monitor effectiveness of current breast feeding efforts. - Assess support systems available to mother/family.   - mother/family.  - Identify cultural beliefs/practices regarding lactation, letdown techniques, maternal food preferences.   - Assess mother's knowledge and previous experience with breast feeding.  - Provide information as needed about early infant feeding

## 2017-09-05 NOTE — LACTATION NOTE
LACTATION NOTE - MOTHER      Evaluation Type: Inpatient    Problems identified  Problems identified: Knowledge deficit         Breastfeeding goal  Breastfeeding goal: To maintain breast milk feeding per patient goal    Maternal Assessment  Bilateral Breast

## 2017-09-05 NOTE — PROGRESS NOTES
Patient doing well, ready to go home    AVSS  Lochia normal  No calf pain or edema    PPD#2   - Home today  - Follow up Dr. Donald Ruelas 6 weeks      Talia Gao MD  17

## 2017-09-19 ENCOUNTER — TELEPHONE (OUTPATIENT)
Dept: OBGYN CLINIC | Facility: CLINIC | Age: 32
End: 2017-09-19

## 2017-09-19 NOTE — TELEPHONE ENCOUNTER
Filled out form for pts breast pump and faxed back to [facility] at [phone].

## 2017-09-22 ENCOUNTER — TELEPHONE (OUTPATIENT)
Dept: OBGYN CLINIC | Facility: CLINIC | Age: 32
End: 2017-09-22

## 2017-09-22 NOTE — TELEPHONE ENCOUNTER
[place] requesting referral for auth for pts breast pump. Romi Blanton initiated referral and is was approved. Faxed auth to [address] at [phone].

## 2017-10-11 ENCOUNTER — NURSE ONLY (OUTPATIENT)
Dept: LACTATION | Facility: HOSPITAL | Age: 32
End: 2017-10-11
Payer: COMMERCIAL

## 2017-10-11 PROCEDURE — 99211 OFF/OP EST MAY X REQ PHY/QHP: CPT

## 2017-10-11 NOTE — PROGRESS NOTES
Mom is here today for a consult on weaning infant from nipple shield. Upon observation of infant, infant was noted to have a very tight oral cavity, LC hardly able to lift tongue digitally. Infant also noted to have a slightly receded chin.  It was also not

## 2017-10-16 ENCOUNTER — OFFICE VISIT (OUTPATIENT)
Dept: OBGYN CLINIC | Facility: CLINIC | Age: 32
End: 2017-10-16

## 2017-10-16 VITALS — WEIGHT: 148 LBS | DIASTOLIC BLOOD PRESSURE: 70 MMHG | BODY MASS INDEX: 23 KG/M2 | SYSTOLIC BLOOD PRESSURE: 128 MMHG

## 2017-10-16 NOTE — PROGRESS NOTES
Brandy Albarran is here for postpartum check. Patient had normal spontaneous precipitous vaginal delivery 6 weeks ago. Currently she is nursing every 3-4 hours and has no complaints.     On examination: Her abdominal exam was completely normal.    Pelvic exam: Ex

## 2017-11-20 ENCOUNTER — NURSE TRIAGE (OUTPATIENT)
Dept: OTHER | Age: 32
End: 2017-11-20

## 2017-11-20 ENCOUNTER — OFFICE VISIT (OUTPATIENT)
Dept: FAMILY MEDICINE CLINIC | Facility: CLINIC | Age: 32
End: 2017-11-20

## 2017-11-20 VITALS
HEART RATE: 93 BPM | WEIGHT: 141 LBS | TEMPERATURE: 99 F | SYSTOLIC BLOOD PRESSURE: 109 MMHG | BODY MASS INDEX: 22.13 KG/M2 | DIASTOLIC BLOOD PRESSURE: 74 MMHG | HEIGHT: 67 IN | RESPIRATION RATE: 17 BRPM

## 2017-11-20 DIAGNOSIS — J01.90 ACUTE SINUSITIS, RECURRENCE NOT SPECIFIED, UNSPECIFIED LOCATION: Primary | ICD-10-CM

## 2017-11-20 PROCEDURE — 99212 OFFICE O/P EST SF 10 MIN: CPT | Performed by: FAMILY MEDICINE

## 2017-11-20 PROCEDURE — 99213 OFFICE O/P EST LOW 20 MIN: CPT | Performed by: FAMILY MEDICINE

## 2017-11-20 RX ORDER — AMOXICILLIN AND CLAVULANATE POTASSIUM 875; 125 MG/1; MG/1
1 TABLET, FILM COATED ORAL 2 TIMES DAILY
Qty: 20 TABLET | Refills: 0 | Status: SHIPPED | OUTPATIENT
Start: 2017-11-20 | End: 2017-11-30

## 2017-11-20 NOTE — TELEPHONE ENCOUNTER
Action Requested: Summary for Provider     []  Critical Lab, Recommendations Needed  [x] Need Additional Advice  []   FYI    []   Need Orders  [] Need Medications Sent to Pharmacy  []  Other     SUMMARY: Patient reports having sinus pain/pressure, producti new  Precipitated by: no precipitating factors  Aggravated by: no aggravating factors  Alleviated by: acetaminophen                 Reason for Disposition  • Sinus congestion (pressure, fullness) present > 10 days    Protocols used: SINUS PAIN AND CONGESTI

## 2017-11-20 NOTE — TELEPHONE ENCOUNTER
Advised patient of Dr. Hiral Rock note. Patient verbalized understanding will be in the office in 10 minutes. Appointment booked.

## 2017-11-20 NOTE — PROGRESS NOTES
HPI:    Patient ID: Siva Haywood is a 28year old female. Sinusitis   This is a new problem. The current episode started 1 to 4 weeks ago. The problem has been gradually worsening since onset. There has been no fever. The pain is moderate.  Ryanne Kelly recurrent sinusitis. Augmentin as directed reviewed instructions and side effects of medication. Call if not improved in 1-2 weeks. No orders of the defined types were placed in this encounter.       Meds This Visit:  Signed Prescriptions Disp Refills

## 2017-12-20 ENCOUNTER — OFFICE VISIT (OUTPATIENT)
Dept: OBGYN CLINIC | Facility: CLINIC | Age: 32
End: 2017-12-20

## 2017-12-20 VITALS — BODY MASS INDEX: 21 KG/M2 | SYSTOLIC BLOOD PRESSURE: 112 MMHG | DIASTOLIC BLOOD PRESSURE: 64 MMHG | WEIGHT: 134 LBS

## 2017-12-20 DIAGNOSIS — N76.0 VAGINITIS AND VULVOVAGINITIS: Primary | ICD-10-CM

## 2017-12-20 PROCEDURE — 87624 HPV HI-RISK TYP POOLED RSLT: CPT | Performed by: OBSTETRICS & GYNECOLOGY

## 2017-12-20 PROCEDURE — 99395 PREV VISIT EST AGE 18-39: CPT | Performed by: OBSTETRICS & GYNECOLOGY

## 2017-12-20 PROCEDURE — 81025 URINE PREGNANCY TEST: CPT | Performed by: OBSTETRICS & GYNECOLOGY

## 2017-12-20 PROCEDURE — 88175 CYTOPATH C/V AUTO FLUID REDO: CPT | Performed by: OBSTETRICS & GYNECOLOGY

## 2017-12-20 RX ORDER — ACETAMINOPHEN AND CODEINE PHOSPHATE 120; 12 MG/5ML; MG/5ML
0.35 SOLUTION ORAL DAILY
Qty: 3 PACKAGE | Refills: 4 | Status: SHIPPED | OUTPATIENT
Start: 2017-12-20 | End: 2018-01-17

## 2017-12-20 NOTE — PROGRESS NOTES
Carissa Shaikh is here for a checkup. She is about 3 months postpartum and she was thinking of having Mirena IUD inserted today but she has changed her mind and would like to go on progesterone only pill. I discussed risks, benefits, alternatives with her.  Risk Prescriptions Disp Refills    Norethindrone (ORTHO MICRONOR) 0.35 MG Oral Tab 3 Package 4     Sig: Take 1 tablet (0.35 mg total) by mouth daily.            IMAGING/ REFERRALS:      None     (Z30.430) Encounter for IUD insertion  (primary encounter diagnosis

## 2018-01-02 ENCOUNTER — IMMUNIZATION (OUTPATIENT)
Dept: FAMILY MEDICINE CLINIC | Facility: CLINIC | Age: 33
End: 2018-01-02

## 2018-01-02 DIAGNOSIS — Z23 NEED FOR VACCINATION: ICD-10-CM

## 2018-01-02 PROCEDURE — 90686 IIV4 VACC NO PRSV 0.5 ML IM: CPT | Performed by: FAMILY MEDICINE

## 2018-01-02 PROCEDURE — 90471 IMMUNIZATION ADMIN: CPT | Performed by: FAMILY MEDICINE

## 2018-03-07 ENCOUNTER — TELEPHONE (OUTPATIENT)
Dept: FAMILY MEDICINE CLINIC | Facility: CLINIC | Age: 33
End: 2018-03-07

## 2018-03-07 DIAGNOSIS — Z91.89 BREASTFEEDING PROBLEM: Primary | ICD-10-CM

## 2018-03-07 NOTE — TELEPHONE ENCOUNTER
Pt was here for child's well visit. While here requested referrals for Hospital grade breast pump. Also electric breast pump.

## 2018-03-08 NOTE — TELEPHONE ENCOUNTER
patient with severe decrease in breast milk supply with recent illness patient and infant. She has borrowed a hospital grade breast pump which has been very effective, supplies starting to increase. But she cannot keep it and needs one through [facility].   I e

## 2018-05-23 ENCOUNTER — TELEPHONE (OUTPATIENT)
Dept: FAMILY MEDICINE CLINIC | Facility: CLINIC | Age: 33
End: 2018-05-23

## 2018-05-23 ENCOUNTER — NURSE ONLY (OUTPATIENT)
Dept: FAMILY MEDICINE CLINIC | Facility: CLINIC | Age: 33
End: 2018-05-23

## 2018-05-23 DIAGNOSIS — Z11.1 ENCOUNTER FOR PPD SKIN TEST READING: Primary | ICD-10-CM

## 2018-05-23 PROCEDURE — 86580 TB INTRADERMAL TEST: CPT | Performed by: FAMILY MEDICINE

## 2018-05-23 NOTE — TELEPHONE ENCOUNTER
MATT please schedule NV. If placed today pt must have it placed no later than 12pm if pt return on Saturday before 12 to have it read.

## 2018-05-25 ENCOUNTER — NURSE ONLY (OUTPATIENT)
Dept: FAMILY MEDICINE CLINIC | Facility: CLINIC | Age: 33
End: 2018-05-25

## 2018-05-25 DIAGNOSIS — Z11.1 ENCOUNTER FOR PPD SKIN TEST READING: Primary | ICD-10-CM

## 2018-08-13 ENCOUNTER — NURSE TRIAGE (OUTPATIENT)
Dept: OTHER | Age: 33
End: 2018-08-13

## 2018-08-13 RX ORDER — TOBRAMYCIN 3 MG/ML
1 SOLUTION/ DROPS OPHTHALMIC EVERY 4 HOURS
Qty: 5 ML | Refills: 0 | Status: SHIPPED | OUTPATIENT
Start: 2018-08-13 | End: 2018-08-20

## 2018-08-13 NOTE — TELEPHONE ENCOUNTER
Please let patient know I have sent Rx to pharmacy in case of infection. Call if any worsening symptoms or if not resolved in 5 days.

## 2018-08-13 NOTE — TELEPHONE ENCOUNTER
Action Requested: Summary for Provider     []  Critical Lab, Recommendations Needed  [] Need Additional Advice  []   FYI    []   Need Orders  [] Need Medications Sent to Pharmacy  []  Other     SUMMARY:  Patient is asking for a medication.    Patient is cur

## 2018-08-13 NOTE — TELEPHONE ENCOUNTER
Informed pt Dr. Nico Maurer instructions below. Rx sent to the pharmacy. Pt verbalized understanding.

## 2018-09-18 ENCOUNTER — OFFICE VISIT (OUTPATIENT)
Dept: FAMILY MEDICINE CLINIC | Facility: CLINIC | Age: 33
End: 2018-09-18

## 2018-09-18 VITALS
BODY MASS INDEX: 20.19 KG/M2 | RESPIRATION RATE: 16 BRPM | TEMPERATURE: 99 F | DIASTOLIC BLOOD PRESSURE: 80 MMHG | SYSTOLIC BLOOD PRESSURE: 118 MMHG | HEART RATE: 69 BPM | HEIGHT: 67 IN | WEIGHT: 128.63 LBS

## 2018-09-18 DIAGNOSIS — O99.013 ANEMIA DURING PREGNANCY IN THIRD TRIMESTER: ICD-10-CM

## 2018-09-18 DIAGNOSIS — H04.123 DRY EYES: ICD-10-CM

## 2018-09-18 DIAGNOSIS — Z00.00 ROUTINE PHYSICAL EXAMINATION: Primary | ICD-10-CM

## 2018-09-18 PROCEDURE — 99395 PREV VISIT EST AGE 18-39: CPT | Performed by: FAMILY MEDICINE

## 2018-09-18 RX ORDER — ACETAMINOPHEN AND CODEINE PHOSPHATE 120; 12 MG/5ML; MG/5ML
0.35 SOLUTION ORAL DAILY
Qty: 84 TABLET | Refills: 3 | Status: SHIPPED | OUTPATIENT
Start: 2018-09-18 | End: 2019-05-22 | Stop reason: ALTCHOICE

## 2018-09-25 ENCOUNTER — APPOINTMENT (OUTPATIENT)
Dept: LAB | Age: 33
End: 2018-09-25
Attending: FAMILY MEDICINE
Payer: COMMERCIAL

## 2018-09-25 DIAGNOSIS — H04.123 DRY EYES: ICD-10-CM

## 2018-09-25 DIAGNOSIS — O99.013 ANEMIA DURING PREGNANCY IN THIRD TRIMESTER: ICD-10-CM

## 2018-09-25 DIAGNOSIS — Z00.00 ROUTINE PHYSICAL EXAMINATION: ICD-10-CM

## 2018-09-25 LAB
CHOLEST SERPL-MCNC: 202 MG/DL (ref 110–200)
ERYTHROCYTE [DISTWIDTH] IN BLOOD BY AUTOMATED COUNT: 13 % (ref 11–15)
GLUCOSE SERPL-MCNC: 90 MG/DL (ref 70–99)
HCT VFR BLD AUTO: 40.1 % (ref 35–48)
HDLC SERPL-MCNC: 54 MG/DL
HGB BLD-MCNC: 13.6 G/DL (ref 12–16)
LDLC SERPL CALC-MCNC: 127 MG/DL (ref 0–99)
MCH RBC QN AUTO: 29.1 PG (ref 27–32)
MCHC RBC AUTO-ENTMCNC: 33.9 G/DL (ref 32–37)
MCV RBC AUTO: 85.8 FL (ref 80–100)
NONHDLC SERPL-MCNC: 148 MG/DL
PLATELET # BLD AUTO: 155 K/UL (ref 140–400)
PMV BLD AUTO: 10.8 FL (ref 7.4–10.3)
RBC # BLD AUTO: 4.68 M/UL (ref 3.7–5.4)
TRIGL SERPL-MCNC: 107 MG/DL (ref 1–149)
WBC # BLD AUTO: 5.1 K/UL (ref 4–11)

## 2018-10-02 ENCOUNTER — IMMUNIZATION (OUTPATIENT)
Dept: FAMILY MEDICINE CLINIC | Facility: CLINIC | Age: 33
End: 2018-10-02

## 2018-10-02 DIAGNOSIS — Z23 NEED FOR VACCINATION: ICD-10-CM

## 2018-10-02 PROCEDURE — 90686 IIV4 VACC NO PRSV 0.5 ML IM: CPT | Performed by: FAMILY MEDICINE

## 2018-10-02 PROCEDURE — 90471 IMMUNIZATION ADMIN: CPT | Performed by: FAMILY MEDICINE

## 2019-03-12 ENCOUNTER — OFFICE VISIT (OUTPATIENT)
Dept: FAMILY MEDICINE CLINIC | Facility: CLINIC | Age: 34
End: 2019-03-12
Payer: COMMERCIAL

## 2019-03-12 VITALS
HEART RATE: 76 BPM | SYSTOLIC BLOOD PRESSURE: 105 MMHG | DIASTOLIC BLOOD PRESSURE: 72 MMHG | BODY MASS INDEX: 20 KG/M2 | RESPIRATION RATE: 17 BRPM | TEMPERATURE: 100 F | HEIGHT: 67 IN

## 2019-03-12 DIAGNOSIS — R68.89 FLU-LIKE SYMPTOMS: Primary | ICD-10-CM

## 2019-03-12 LAB
FLUAV + FLUBV RNA SPEC NAA+PROBE: NEGATIVE

## 2019-03-12 PROCEDURE — 99213 OFFICE O/P EST LOW 20 MIN: CPT | Performed by: FAMILY MEDICINE

## 2019-03-12 PROCEDURE — 99212 OFFICE O/P EST SF 10 MIN: CPT | Performed by: FAMILY MEDICINE

## 2019-03-12 RX ORDER — OSELTAMIVIR PHOSPHATE 75 MG/1
75 CAPSULE ORAL 2 TIMES DAILY
Qty: 10 CAPSULE | Refills: 0 | Status: SHIPPED | OUTPATIENT
Start: 2019-03-12 | End: 2019-03-17

## 2019-03-12 NOTE — PROGRESS NOTES
HPI:    Rosalina Baxter is a 35year old female presents to clinic with fevers, chills, body aches, headaches, and a sore throat that started this morning. Patient notes she is congested as well. Notes an occasional dry cough.   Last night when she w Eyes: Conjunctivae and EOM are normal. Pupils are equal, round, and reactive to light. Neck: Normal range of motion. Neck supple. No thyromegaly present. Cardiovascular: Normal rate, regular rhythm and normal heart sounds. No murmur heard.   Pulmona

## 2019-03-13 ENCOUNTER — TELEPHONE (OUTPATIENT)
Dept: OTHER | Age: 34
End: 2019-03-13

## 2019-03-13 ENCOUNTER — TELEPHONE (OUTPATIENT)
Dept: FAMILY MEDICINE CLINIC | Facility: CLINIC | Age: 34
End: 2019-03-13

## 2019-03-13 RX ORDER — AZITHROMYCIN 250 MG/1
TABLET, FILM COATED ORAL
Qty: 6 TABLET | Refills: 0 | Status: SHIPPED | OUTPATIENT
Start: 2019-03-13 | End: 2019-05-22 | Stop reason: ALTCHOICE

## 2019-03-13 NOTE — TELEPHONE ENCOUNTER
Spoke with patient ( verified) and relayed SK message below--patient verbalizes understanding and agreement. Relayed to patient again to rest and stay hydrated and to call back if symptoms do not resolve.     \"I'm quarantined downstairs and my  i

## 2019-03-13 NOTE — TELEPHONE ENCOUNTER
Called patient to discuss labs, no answer so a VM was left asking to call back. When patient calls back, please relay the following message:    + strep culture, non Group A strep.  Antibiotics to pharmacy, please have patient discard toothbrush 24 hours aft

## 2019-03-13 NOTE — TELEPHONE ENCOUNTER
Patient was calling to follow up on the Influenza test from yesterday. I instructed was negative. She is asking if she still needs to continue the Tamiflu? The patient continues to have a fever of 100.5 to 101 and a sore throat.   instructed to derian

## 2019-05-22 ENCOUNTER — OFFICE VISIT (OUTPATIENT)
Dept: FAMILY MEDICINE CLINIC | Facility: CLINIC | Age: 34
End: 2019-05-22
Payer: COMMERCIAL

## 2019-05-22 VITALS
HEART RATE: 88 BPM | BODY MASS INDEX: 20 KG/M2 | WEIGHT: 128.19 LBS | DIASTOLIC BLOOD PRESSURE: 70 MMHG | SYSTOLIC BLOOD PRESSURE: 110 MMHG | RESPIRATION RATE: 18 BRPM | TEMPERATURE: 98 F

## 2019-05-22 DIAGNOSIS — R14.0 BLOATING: Primary | ICD-10-CM

## 2019-05-22 DIAGNOSIS — R10.12 INTERMITTENT LEFT UPPER QUADRANT ABDOMINAL PAIN: ICD-10-CM

## 2019-05-22 PROCEDURE — 99212 OFFICE O/P EST SF 10 MIN: CPT | Performed by: FAMILY MEDICINE

## 2019-05-22 PROCEDURE — 99213 OFFICE O/P EST LOW 20 MIN: CPT | Performed by: FAMILY MEDICINE

## 2019-05-22 NOTE — PROGRESS NOTES
HPI:    Patient ID: Flo Linares is a 35year old female. Bloating   This is a new problem. The current episode started 1 to 4 weeks ago. The problem occurs intermittently. The problem has been waxing and waning.  Associated symptoms include fatigu lipase. No orders of the defined types were placed in this encounter.       Meds This Visit:  Requested Prescriptions      No prescriptions requested or ordered in this encounter       Imaging & Referrals:  None       RU#[id number]

## 2019-06-04 ENCOUNTER — TELEPHONE (OUTPATIENT)
Dept: FAMILY MEDICINE CLINIC | Facility: CLINIC | Age: 34
End: 2019-06-04

## 2019-06-04 DIAGNOSIS — R10.12 ABDOMINAL PAIN, LEFT UPPER QUADRANT: Primary | ICD-10-CM

## 2019-06-04 NOTE — TELEPHONE ENCOUNTER
Spoke with Manuelito and notified her ultrasound order and labs placed in chart. Orders printed and placed at  for .  No further action needed

## 2019-06-04 NOTE — TELEPHONE ENCOUNTER
Please let her know I have ordered ultrasound. Also ordered labs and stool test.  Recommend to make gastroenterology appointment, since there is a delay in getting appointment make now while testing is still pending.   Please print ultrasound order for pic

## 2019-06-04 NOTE — TELEPHONE ENCOUNTER
Patient following up, had OV 5/22/19 for bloating and pressure under left rib cage, is following up because symptoms have not improved, continues to have pressure to same area, feels worse in the evening or with long periods of sitting, also with visible b

## 2019-06-07 ENCOUNTER — TELEPHONE (OUTPATIENT)
Dept: FAMILY MEDICINE CLINIC | Facility: CLINIC | Age: 34
End: 2019-06-07

## 2019-06-07 DIAGNOSIS — M62.08 RECTUS DIASTASIS: Primary | ICD-10-CM

## 2019-06-07 RX ORDER — DICYCLOMINE HYDROCHLORIDE 10 MG/1
10 CAPSULE ORAL 4 TIMES DAILY
Qty: 40 CAPSULE | Refills: 3 | Status: SHIPPED | OUTPATIENT
Start: 2019-06-07 | End: 2019-06-17

## 2019-06-07 NOTE — TELEPHONE ENCOUNTER
Patient contacted. Abdominal ultrasound CBC, CMP normal.  Recommend trial of Bentyl for possible PCOS.   Also physical therapy evaluation as patient has had abdominal diastases and abdominal pain is largely positional.

## 2019-06-10 ENCOUNTER — MED REC SCAN ONLY (OUTPATIENT)
Dept: FAMILY MEDICINE CLINIC | Facility: CLINIC | Age: 34
End: 2019-06-10

## 2019-07-26 ENCOUNTER — NURSE TRIAGE (OUTPATIENT)
Dept: FAMILY MEDICINE CLINIC | Facility: CLINIC | Age: 34
End: 2019-07-26

## 2019-07-26 ENCOUNTER — OFFICE VISIT (OUTPATIENT)
Dept: FAMILY MEDICINE CLINIC | Facility: CLINIC | Age: 34
End: 2019-07-26
Payer: COMMERCIAL

## 2019-07-26 VITALS
RESPIRATION RATE: 17 BRPM | TEMPERATURE: 99 F | SYSTOLIC BLOOD PRESSURE: 129 MMHG | HEART RATE: 77 BPM | HEIGHT: 67 IN | DIASTOLIC BLOOD PRESSURE: 91 MMHG | BODY MASS INDEX: 20 KG/M2

## 2019-07-26 DIAGNOSIS — J01.90 ACUTE SINUSITIS, RECURRENCE NOT SPECIFIED, UNSPECIFIED LOCATION: Primary | ICD-10-CM

## 2019-07-26 PROCEDURE — 99213 OFFICE O/P EST LOW 20 MIN: CPT | Performed by: FAMILY MEDICINE

## 2019-07-26 RX ORDER — AMOXICILLIN AND CLAVULANATE POTASSIUM 875; 125 MG/1; MG/1
1 TABLET, FILM COATED ORAL 2 TIMES DAILY
Qty: 20 TABLET | Refills: 0 | Status: SHIPPED | OUTPATIENT
Start: 2019-07-26 | End: 2019-08-05

## 2019-07-26 RX ORDER — FLUTICASONE PROPIONATE 50 MCG
2 SPRAY, SUSPENSION (ML) NASAL DAILY
Qty: 1 BOTTLE | Refills: 5 | Status: SHIPPED | OUTPATIENT
Start: 2019-07-26 | End: 2020-07-25

## 2019-07-26 NOTE — TELEPHONE ENCOUNTER
Action Requested: Summary for Provider     []  Critical Lab, Recommendations Needed  [] Need Additional Advice  []   FYI    []   Need Orders  [] Need Medications Sent to Pharmacy  []  Other     SUMMARY: Patient requesting appt today with [facility] for symptoms o

## 2019-07-26 NOTE — PROGRESS NOTES
HPI:    Patient ID: Rebekah Fontaine is a 35year old female. Sinus Problem   This is a new problem. The current episode started in the past 7 days. The problem has been gradually worsening since onset.  The maximum temperature recorded prior to her ar Skin is warm and dry.               ASSESSMENT/PLAN:   Acute sinusitis, recurrence not specified, unspecified location  (primary encounter diagnosis)     Patient complains of congestion, purulent postnasal drip, tender cervical nodes, cough for the past 4 d

## 2019-08-13 ENCOUNTER — TELEPHONE (OUTPATIENT)
Dept: FAMILY MEDICINE CLINIC | Facility: CLINIC | Age: 34
End: 2019-08-13

## 2019-08-13 ENCOUNTER — NURSE TRIAGE (OUTPATIENT)
Dept: OTHER | Age: 34
End: 2019-08-13

## 2019-08-13 RX ORDER — FLUCONAZOLE 150 MG/1
150 TABLET ORAL ONCE
Qty: 1 TABLET | Refills: 0 | Status: SHIPPED | OUTPATIENT
Start: 2019-08-13 | End: 2019-08-13

## 2019-08-13 NOTE — TELEPHONE ENCOUNTER
Action Requested: Summary for Provider     []  Critical Lab, Recommendations Needed  [] Need Additional Advice  []   FYI    []   Need Orders  [] Need Medications Sent to Pharmacy  []  Other     SUMMARY: pt states that she was prescribed an antibiotic recen

## 2019-08-13 NOTE — TELEPHONE ENCOUNTER
Pt calling want to see if Dr. Fariba Lopez can prescribe medication she think she have a possible yeast infection.   She just was on antibiotics and she leaving for [place] tomorrow like 4:00 am and can't get on to see the Dr. Fariba Lopez

## 2019-08-13 NOTE — TELEPHONE ENCOUNTER
Spoke with patient ( verified) and relayed [facility] message below--patient verbalizes understanding and agreement. No further questions/concerns at this time.

## 2019-10-23 ENCOUNTER — IMMUNIZATION (OUTPATIENT)
Dept: FAMILY MEDICINE CLINIC | Facility: CLINIC | Age: 34
End: 2019-10-23
Payer: COMMERCIAL

## 2019-10-23 DIAGNOSIS — Z23 NEED FOR VACCINATION: ICD-10-CM

## 2019-10-23 PROCEDURE — 90686 IIV4 VACC NO PRSV 0.5 ML IM: CPT | Performed by: FAMILY MEDICINE

## 2019-10-23 PROCEDURE — 90471 IMMUNIZATION ADMIN: CPT | Performed by: FAMILY MEDICINE

## 2019-12-30 ENCOUNTER — TELEPHONE (OUTPATIENT)
Dept: FAMILY MEDICINE CLINIC | Facility: CLINIC | Age: 34
End: 2019-12-30

## 2019-12-30 NOTE — TELEPHONE ENCOUNTER
[facility] and was given Augmentin 875 BID for 7 days. She was running low grade fever until this am T 100.5, lungs are clear, not coughing ; she is a little SOB and has swollen throat.  The provider who ex

## 2019-12-30 NOTE — TELEPHONE ENCOUNTER
Patient is requesting to speak to nurse regarding her sinus infection symptoms. Patient states her mother was just diagnosed with the flu. Patient is worried she could possibly have the flu.     Patient already has an appointment scheduled with Dr. Palak Mary t

## 2020-03-25 ENCOUNTER — NURSE TRIAGE (OUTPATIENT)
Dept: FAMILY MEDICINE CLINIC | Facility: CLINIC | Age: 35
End: 2020-03-25

## 2020-03-25 RX ORDER — ALBUTEROL SULFATE 90 UG/1
2 AEROSOL, METERED RESPIRATORY (INHALATION) EVERY 4 HOURS PRN
Qty: 1 INHALER | Refills: 3 | Status: SHIPPED | OUTPATIENT
Start: 2020-03-25 | End: 2020-11-03 | Stop reason: ALTCHOICE

## 2020-03-25 NOTE — TELEPHONE ENCOUNTER
Patient contacted. Discussed symptoms. No dyspnea or fever. Suspect bronchitis. Albuterol as directed reviewed instructions and side effects. If persistent/worsening chest discomfort or new symptoms develop may consider chest x-ray, COVID testing.

## 2020-03-25 NOTE — TELEPHONE ENCOUNTER
Action Requested: Summary for Provider     []  Critical Lab, Recommendations Needed  [] Need Additional Advice  []   FYI    []   Need Orders  [] Need Medications Sent to Pharmacy  []  Other     SUMMARY:  Patient states her 3 yr old and 3 yr old were sick l

## 2020-07-21 NOTE — PROGRESS NOTES
Outpatient Maternal-Fetal Medicine Consultation    Dear Dr. Josette Argueta,    Thank you for requesting ultrasound evaluation and maternal fetal medicine consultation on your patient Jamal Wood.   As you are aware she is a 32year old female wit has had tonsillectomy and repair of tear duct in the right eye.  2 years ago she had low-grade JANIS Pap smear and since then her Pap smears have been normal.  She is interested in having m    Comment: Shira Mera is here for a checkup.   Patient is back from her CR, Take 50 mg by mouth daily. , Disp: , Rfl:   Allergies:   Shellfish-Derived P*          PHYSICAL EXAMINATION:  /75   Pulse 85   LMP 12/18/2016   General: alert and oriented,no acute distress  Abdomen: gravid, soft, non-tender  Extremities: non-tend missed. I discussed the results with the patient. DISCUSSION  During her visit we discussed and reviewed the following issues: The limitations of fetal echocardiogram and the limitations based on her advanced gestational age and the fetal position. Myalgia Treatment: I explained this is common when taking isotretinoin. If this worsens they will contact us. They may try OTC ibuprofen. Any Nosebleeds?: Yes - Normal Treatment Xerosis Normal Treatment: I recommended application of Cetaphil or CeraVe numerous times a day and before going to bed to all dry areas. Retinoid Dermatitis Aggressive Treatment: I recommended more frequent application of Cetaphil or CeraVe to the areas of dermatitis. I also prescribed a topical steroid for twice daily use until the dermatitis resolves. Hypercholesterolemia Monitoring: I explained this is common when taking isotretinoin. We will monitor closely. Any Myalgia?: No Cheilitis Normal Treatment: I recommended application of Vaseline or Aquaphor numerous times a day (as often as every hour) and before going to bed. Lower Range (In Mg/Kg): 120 What Is The Patient's Gender: Male Xerosis Normal Treatment: I recommended application of Cetaphil or CeraVe numerous times a day going to bed to all dry areas. Female Completion Statement: After discussing her treatment course we decided to discontinue isotretinoin therapy at this time. I explained that she would need to continue her birth control methods for at least one month after the last dosage. She should also get a pregnancy test one month after the last dose. She shouldn't donate blood for one month after the last dose. She should call with any new symptoms of depression. Dosing Month 2 (Required For Cumulative Dosing): 40mg BID Months Of Therapy Completed: 2 Xerosis Aggressive Treatment: I recommended application of Cetaphil or CeraVe numerous times a day going to bed to all dry areas. I also prescribed a topical steroid for twice daily use. Cheilitis Aggressive Treatment: I recommended application of Vaseline or Aquaphor numerous times a day (as often as every hour) and before going to bed. I also prescribed a topical steroid for twice daily use. Target Cumulative Dosage (In Mg/Kg): 135 Myalgia Monitoring: I explained this is common when taking isotretinoin. If this worsens they will contact us. Next Month's Dosage: Continue Current Dosage Kilograms Preamble Statement (Weight Entered In Details Tab): Reported Weight in kilograms: Retinoid Dermatitis Normal Treatment: I recommended more frequent application of Cetaphil or CeraVe to the areas of dermatitis. Dosing Month 1 (Required For Cumulative Dosing): 40mg Daily Add Associated Diagnosis When Managing Medication Side Effects: Yes Nosebleeds Normal Treatment: I explained this is common when taking isotretinoin. I recommended saline mist in each nostril multiple times a day. If this worsens they will contact us. Weight Units: pounds Detail Level: Zone Upper Range (In Mg/Kg): 150 Counseling Text: I reviewed the side effect in detail. Patient should get monthly blood tests, not donate blood, not drive at night if vision affected, and not share medication. Pounds Preamble Statement (Weight Entered In Details Tab): Reported Weight in pounds: Ipledge Number (Optional): [phone] Female Pregnancy Counseling Text: Female patients should also be on two forms of birth control while taking this medication and for one month after their last dose. Male Completion Statement: After discussing his treatment course we decided to discontinue isotretinoin therapy at this time. He shouldn't donate blood for one month after the last dose. He should call with any new symptoms of depression. Headache Monitoring: I recommended monitoring the headaches for now. There is no evidence of increased intracranial pressure. They were instructed to call if the headaches are worsening. Xerosis Aggressive Treatment: I recommended application of Cetaphil or CeraVe numerous times a day and before going to bed to all dry areas. I also prescribed a topical steroid for twice daily use. Are Labs Available For Review?: No- Not Drawn Yet Use Therapeutic Ranged Or Therapeutic Target: please select Range or Target

## 2020-11-03 ENCOUNTER — OFFICE VISIT (OUTPATIENT)
Dept: FAMILY MEDICINE CLINIC | Facility: CLINIC | Age: 35
End: 2020-11-03
Payer: COMMERCIAL

## 2020-11-03 ENCOUNTER — LAB ENCOUNTER (OUTPATIENT)
Dept: LAB | Age: 35
End: 2020-11-03
Attending: FAMILY MEDICINE
Payer: COMMERCIAL

## 2020-11-03 VITALS
DIASTOLIC BLOOD PRESSURE: 76 MMHG | BODY MASS INDEX: 19.64 KG/M2 | RESPIRATION RATE: 18 BRPM | TEMPERATURE: 97 F | HEIGHT: 67.5 IN | WEIGHT: 126.63 LBS | HEART RATE: 64 BPM | SYSTOLIC BLOOD PRESSURE: 109 MMHG

## 2020-11-03 DIAGNOSIS — Z01.419 ENCOUNTER FOR GYNECOLOGICAL EXAMINATION WITHOUT ABNORMAL FINDING: ICD-10-CM

## 2020-11-03 DIAGNOSIS — Z01.419 ENCOUNTER FOR GYNECOLOGICAL EXAMINATION WITHOUT ABNORMAL FINDING: Primary | ICD-10-CM

## 2020-11-03 DIAGNOSIS — F41.9 ANXIETY: ICD-10-CM

## 2020-11-03 DIAGNOSIS — I83.92 VARICOSE VEINS OF LEFT LOWER EXTREMITY, UNSPECIFIED WHETHER COMPLICATED: ICD-10-CM

## 2020-11-03 PROCEDURE — 84443 ASSAY THYROID STIM HORMONE: CPT

## 2020-11-03 PROCEDURE — 90686 IIV4 VACC NO PRSV 0.5 ML IM: CPT | Performed by: FAMILY MEDICINE

## 2020-11-03 PROCEDURE — 3008F BODY MASS INDEX DOCD: CPT | Performed by: FAMILY MEDICINE

## 2020-11-03 PROCEDURE — 90471 IMMUNIZATION ADMIN: CPT | Performed by: FAMILY MEDICINE

## 2020-11-03 PROCEDURE — 99395 PREV VISIT EST AGE 18-39: CPT | Performed by: FAMILY MEDICINE

## 2020-11-03 PROCEDURE — 36415 COLL VENOUS BLD VENIPUNCTURE: CPT

## 2020-11-03 PROCEDURE — 80048 BASIC METABOLIC PNL TOTAL CA: CPT

## 2020-11-03 PROCEDURE — 99072 ADDL SUPL MATRL&STAF TM PHE: CPT | Performed by: FAMILY MEDICINE

## 2020-11-03 PROCEDURE — 3074F SYST BP LT 130 MM HG: CPT | Performed by: FAMILY MEDICINE

## 2020-11-03 PROCEDURE — 85027 COMPLETE CBC AUTOMATED: CPT

## 2020-11-03 PROCEDURE — 3078F DIAST BP <80 MM HG: CPT | Performed by: FAMILY MEDICINE

## 2020-11-03 PROCEDURE — 80061 LIPID PANEL: CPT

## 2020-11-03 RX ORDER — ALPRAZOLAM 0.25 MG/1
0.25 TABLET ORAL NIGHTLY PRN
Qty: 30 TABLET | Refills: 0 | Status: SHIPPED | OUTPATIENT
Start: 2020-11-03

## 2020-11-03 NOTE — PROGRESS NOTES
HPI:    Patient ID: Alex Hopper is a 28year old female. HPI    Review of Systems   Constitutional: Positive for fatigue. Respiratory: Negative. Cardiovascular: Negative. Gastrointestinal: Negative. Skin: Negative.     Neurological: Neg for very occasional use. Reviewed instructions and side effects of medication. Varicose veins of left lower extremity, unspecified whether complicated–behind left knee. History of large varicosity vulva when pregnant. Discussed support hose.   Avoid s

## 2022-01-18 NOTE — PROGRESS NOTES
Naheed Mendez is here just for consult. Patient is taking oral progesterone for first trimester spotting and low progesterone.     Her ultrasound at [facility] on Wednesday showed intrauterine pregnancy at 6 weeks and 2 days with fetal heart rate at 132 b - - -

## 2023-12-09 ENCOUNTER — APPOINTMENT (OUTPATIENT)
Dept: GENERAL RADIOLOGY | Facility: HOSPITAL | Age: 38
End: 2023-12-09
Attending: EMERGENCY MEDICINE
Payer: COMMERCIAL

## 2023-12-09 ENCOUNTER — HOSPITAL ENCOUNTER (EMERGENCY)
Facility: HOSPITAL | Age: 38
Discharge: HOME | End: 2023-12-09
Attending: EMERGENCY MEDICINE
Payer: COMMERCIAL

## 2023-12-09 VITALS
TEMPERATURE: 98 F | OXYGEN SATURATION: 100 % | HEART RATE: 65 BPM | WEIGHT: 126 LBS | HEIGHT: 67 IN | BODY MASS INDEX: 19.78 KG/M2 | RESPIRATION RATE: 19 BRPM | SYSTOLIC BLOOD PRESSURE: 130 MMHG | DIASTOLIC BLOOD PRESSURE: 72 MMHG

## 2023-12-09 DIAGNOSIS — S52.125A CLOSED NONDISPLACED FRACTURE OF HEAD OF LEFT RADIUS, INITIAL ENCOUNTER: Primary | ICD-10-CM

## 2023-12-09 PROCEDURE — 99284 EMERGENCY DEPT VISIT MOD MDM: CPT

## 2023-12-09 PROCEDURE — 73110 X-RAY EXAM OF WRIST: CPT | Performed by: EMERGENCY MEDICINE

## 2023-12-09 PROCEDURE — 73090 X-RAY EXAM OF FOREARM: CPT | Performed by: EMERGENCY MEDICINE

## 2023-12-09 RX ORDER — IBUPROFEN 600 MG/1
600 TABLET ORAL ONCE
Status: COMPLETED | OUTPATIENT
Start: 2023-12-09 | End: 2023-12-09

## 2023-12-09 RX ORDER — TRAMADOL HYDROCHLORIDE 50 MG/1
50 TABLET ORAL EVERY 6 HOURS PRN
Qty: 12 TABLET | Refills: 0 | Status: SHIPPED | OUTPATIENT
Start: 2023-12-09 | End: 2023-12-21

## 2023-12-09 NOTE — ED INITIAL ASSESSMENT (HPI)
Pt reports she tripped over her daughters bike outside on cement. Pt reports she fell onto left arm. Pain to palpation noted to left forearm and left wrist. Pt denies head injury or LOC. +left radial pulse. CMS intact. Cap refill brisk.

## 2024-01-07 NOTE — TELEPHONE ENCOUNTER
Pt is requesting TB test for employment. Pt stts she needs TB today or tomorrow . Okay to schedule nurse visit? 0

## (undated) NOTE — MR AVS SNAPSHOT
[address]  at [place] [address]  [phone]               Thank you for choosing us for your health care visit with Toñito Zimmerman MD.  We are glad to serve you and happy to provide you with Patient Blood Type    Complete by: 2017    Assoc Dx:  Encounter for supervision of normal pregnancy, antepartum, unspecified  [Z34.90]           Hepatitis B Surface Antigen    Complete by:   2017 (Approximate)    Assoc Dx:  Fabiana Ortez [facility]    It is the patient's responsibility to check with and follow their insurance company's guidelines for prior authorization for this test.  You may be held responsible for payment in full if

## (undated) NOTE — MR AVS SNAPSHOT
[address] at [place] [address]  [phone]               Thank you for choosing us for your health care visit with Luigi Cooper MD.  We are glad to serve you and happy to provide you with

## (undated) NOTE — LETTER
5/25/2018              [place] [address] [place] [id number]         To Whom it may concern:    Ran Ophelia has had tuberculin purified protein derivative (PPD) tests as listed below.     INDURATION (PPD)   Date Value

## (undated) NOTE — MR AVS SNAPSHOT
[address] at [facility] ([facility])  [address] [facility]  [phone]               Thank you for choosing us for your health care visit with Na Stratton MD.  We are glad to serve you and zafar [facility]  Diagnostics Main [facility] Parking) (Yellow Parking)  [address]   [address] [facility],  [address] [place], [address] [facility]  [address]  Main

## (undated) NOTE — MR AVS SNAPSHOT
[address] at [address], [facility] 1  [phone]               Thank you for choosing us for your health care visit with [place] [address] ULTRASOUND OP.   We are glad to serve you and happy to provide you w

## (undated) NOTE — ED AVS SNAPSHOT
[facility] Emergency Department    [address]     [place] [address]    Phone:  [phone]    Fax:  [phone]           Ric Collins   MRN: [id number]    Department:  [facility] Emergency Department   Date of Visit:  5/9 and Class Registration line at [phone] or find a doctor online by visiting [email or web address].    IF THERE IS ANY CHANGE OR WORSENING OF YOUR CONDITION, CALL YOUR PRIMARY CARE PHYSICIAN AT ONCE OR RETURN IMMEDIATELY TO [address] [place] [address].     If

## (undated) NOTE — LETTER
Jamal Wood, :1985    CONSENT FOR PROCEDURE/SEDATION    1. I authorize the performance upon Jamal Wood  the following: Insertion IUD    2.  I authorize Dr. Sarahy Grace MD (and whomever is designated as the doctor’s assistant), t Witness: _________________________________________ Date:___________     Physician Signature: _______________________________ Date:___________

## (undated) NOTE — IP AVS SNAPSHOT
[facility]            (For Outpatient Use Only) Initial Admit Date: 8/9/2017   Inpt/Obs Admit Date: Inpt: N/A / Obs: N/A   Discharge Date:    Hospital Acct:  [de-identified]   MRN: [de-identified]   CSN: [id number]        ENCOUNTER  Patient Class: OUT August 9, 2017

## (undated) NOTE — MR AVS SNAPSHOT
After Visit Summary   11/3/2020    Skyler Albarran    MRN: [id number]           Visit Information     Date & Time  11/3/2020 11:00 AM Provider  Yash Cruz, [facility] , [facility] Dept.  Phone  [phone] [facility] now offers Video Visits through [address] for adult and pediatric patients. Video Visits are available Monday - Friday for many common conditions such as allergies, colds, cough, fever, rash, sore throat, headache and pink eye.   The cost for a Video Vi [address]   Monday – Friday  4:00 pm – 10:00 pm   Saturday – Sunday  10:00 am – 4:00 pm  WALK-IN CARE  Emergency Medicine Providers  Conditions needing urgent attention, but are   non-life-threatening.     Also available by appointment Average cost  $120*

## (undated) NOTE — MR AVS SNAPSHOT
[address] at [place] [address]  [phone]               Thank you for choosing us for your health care visit with Chan Rubio MD.  We are glad to serve you and happy to provide you with office, you can view your past visit information in [employer] by going to Visits < Visit Summaries. Shop[employer]ap questions? Call [phone] for help. [employer] is NOT to be used for urgent needs. For medical emergencies, dial 911.            Visit EDWARD-EL

## (undated) NOTE — MR AVS SNAPSHOT
[address] at [facility] ([facility])  [address] [facility] [address] [phone]               Thank you for choosing us for your health care visit with [place] [address] OBN  NURSE.   We are glad to serve you a Take 1 capsule (200 mg total) by mouth nightly. Commonly known as:  PROMETRIUM                   Today's Orders     Progesterone    Complete by:  As directed    Assoc Dx:   Threatened , antepartum [O20.0]           HCG, [address], QUANT [6823]

## (undated) NOTE — MR AVS SNAPSHOT
[address] at [address], [facility] 1  [phone]               Thank you for choosing us for your health care visit with Na Stratton MD.  We are glad to serve you and happy to provide you with [address] [place], [address] [place], 100[address] [facility]  [address]  [address] [facility] 10  [address] [address] [place], [address]

## (undated) NOTE — MR AVS SNAPSHOT
[address] at [place] [address]  [phone]               Thank you for choosing us for your health care visit with Fernando Romero MD.  We are glad to serve you and happy to provide you with discharge instructions in [employer] by going to Visits < Admission Summaries. If you've been to the Emergency Department or your doctor's office, you can view your past visit information in [employer] by going to Visits < Visit Summaries. DySISmedical questions?

## (undated) NOTE — MR AVS SNAPSHOT
[address] at [place] [address]  [phone]               Thank you for choosing us for your health care visit with Ruth Richter MD.  We are glad to serve you and happy to provide you with Medications Administered in the Office Today     Rho D Immune Globulin (RHOGAM) injection SOSY 300 mcg                    Immunizations Administered in the Office Today     RHO(D) Immune Globulin     TDAP       MyChart     Visit MyChart  You can access you

## (undated) NOTE — ED AVS SNAPSHOT
[facility] Emergency Department    [address]     [place] [address]    Phone:  [phone]    Fax:  [phone]           Leni Jenkins   MRN: [id number]    Department:  [facility] Emergency Department   Date of Visit:  5/9 Discharge References/Attachments     VOMITING AND DIARRHEA, NONSPECIFIC (ADULT) (ENGLISH)      Disclosure     Insurance plans vary and the physician(s) referred by the ER may not be covered by your plan.  Please contact your insurance company to determine c CARE PHYSICIAN AT ONCE OR RETURN IMMEDIATELY TO THE EMERGENCY DEPARTMENT. If you have been prescribed any medication(s), please fill your prescription right away and begin taking the medication(s) as directed.   If you believe that any of the medications harming yourself, contact [facility] at [phone]. - If you don’t have insurance, Toya Ortiz has partnered with Patient [address] to help you get signed up for insurance coverage.   Patient [place]

## (undated) NOTE — MR AVS SNAPSHOT
[address] at [place] [address]  [phone]               Thank you for choosing us for your health care visit with Elaine Jolly MD.  We are glad to serve you and happy to provide you with ondansetron 4 MG Tbdp   Take 1 tablet (4 mg total) by mouth every 4 (four) hours as needed for Nausea.    Commonly known as:  [address] can access your MyChart to more actively manage your health care and v

## (undated) NOTE — IP AVS SNAPSHOT
Patient Demographics     Address  [address] [place] [address] Phone  [phone] (Home) *Preferred*  [phone] [facility]) E-mail Address  [email or web address]      Emergency Contact(s)     Name Relation Home Work Mobile    Cesar Farrell INFLUENZA 11/01/16     RHO(D) Immune Globulin 06/14/17     TDAP 06/14/17     TDAP 11/19/14     Tb Intradermal Test 11/19/14     Tb Intradermal Test 11/18/14       Future Appointments        Provider Department Center    8/14/2017 2:30 PM Artemio Dandy

## (undated) NOTE — MR AVS SNAPSHOT
[address] at [address], [place] [address]  [phone]               Thank you for choosing us for your health care visit with Elaine Jolly MD.  We are glad to serve you and happy to provide you with view more details from this visit by going to [email or web address]. If you've recently had a stay at the Hospital you can access your discharge instructions in [employer]hart by going to Visits < Admission Summaries.  If you've been to the Emergency Depar

## (undated) NOTE — MR AVS SNAPSHOT
[address] at [address], [place] [address]  [phone]               Thank you for choosing us for your health care visit with Leona Cates MD.  We are glad to serve you and happy to provide you with Visits < Visit Summaries. MyChart questions? Call [phone] for help. [employer] is NOT to be used for urgent needs. For medical emergencies, dial 911.            Visit [facility] online at  [facility].tn

## (undated) NOTE — MR AVS SNAPSHOT
[address] at [facility] ([facility])  [address] [facility]  [phone]               Thank you for choosing us for your health care visit with Kiley Leon MD.  We are glad to serve you and zafar Positive blood pregnancy test [320972]           Positive blood pregnancy test [163030]           Positive blood pregnancy test [038293]      Results of Recent Testing       MyChart     Sign up for [employer], your secure online medical record.   [facility]